# Patient Record
Sex: MALE | Race: WHITE | NOT HISPANIC OR LATINO | Employment: FULL TIME | URBAN - METROPOLITAN AREA
[De-identification: names, ages, dates, MRNs, and addresses within clinical notes are randomized per-mention and may not be internally consistent; named-entity substitution may affect disease eponyms.]

---

## 2017-03-23 ENCOUNTER — TELEPHONE (OUTPATIENT)
Dept: NURSING | Facility: CLINIC | Age: 47
End: 2017-03-23

## 2017-03-23 NOTE — TELEPHONE ENCOUNTER
"Call Type: Triage Call    Presenting Problem: Caller states that he recently starated checking  his blood pressure at home and yesterday he got readings of 137/98  and then it came down to 127/74 and 109/79 before going to bed. He  checked it agin today and got a re ading of 145/103. He says that he  has be en very stressed about work and personal life. He did make an  appoinmtne for Tuesday. He denies any symptoms  Triage Note:  Guideline Title: Blood Pressure Control Problems  Recommended Disposition: See Provider within 72 Hours  Original Inclination: Wanted to speak with a nurse  Override Disposition:  Intended Action: Follow advice given  Physician Contacted: No  Multiple elevated blood pressure readings without other symptoms AND no previous  work-up OR readings exceed expected range defined by treatment plan ?  YES  Unconscious now ? NO  Severe breathing problems ? NO  New or worsening signs and symptoms that may indicate shock ? NO  Hypertension AND pregnant ? NO  Sudden change in mental status ? NO  Diagnosed hypertension, has had a sudden elevation of blood pressure AND new  swelling around eyes; smoky, cola-colored or dark urine; or swelling of  extremities ? NO  Seizure now or within last 6 hours ? NO  Seizure more than 6 hours ago ? NO  New onset of visual disturbances such as double or blurred vision, spots before  eyes or flashing lights ? NO  New onset of breathing problems ? NO  Nosebleed not controlled after 2 attempts of constant direct pressure for a full  10 minutes by a clock (each attempt) ? NO  Having recurring nosebleeds that stop spontaneously or with direct pressure for 10  minutes by a clock OR unexplained episodes of lightheadedness or dizziness ? NO  Sudden, severe disabling head pain OR caller spontaneously verbalizes \"worst  headache of my life\" ? NO  Systolic blood pressure of more than 180 mmHg OR diastolic blood pressure of more  than 120 mmHg ? NO  Chest discomfort associated with " shortness of breath, sweating, odd heartbeats or  different heart rate, nausea, vomiting, lightheadedness, or fainting lasting 5 or  more minutes now or within the last hour ? NO  Chest pain spreading to the shoulders, neck, jaw, in one or both arms, stomach or  back lasting 5 or more minutes now or within the last hour. Pain is NOT  associated with taking a deep breath or a productive cough, movement, or touch to  a localized area. ? NO  Pressure, fullness, squeezing sensation or pain anywhere in the chest lasting 5 or  more minutes now or within the last hour. Pain is NOT associated with taking a  deep breath or a productive cough, movement, or touch to a localized area on the  chest. ? NO  Sudden drop in systolic (20mm/Hg or more than usual reading) OR diastolic blood  pressure (10mm/Hg or more than usual reading) AND recent change in prescription,  nonprescription, or alternative medications or their dosages. ? NO  A sudden elevation in blood pressure (160 or higher systolic  or higher  diastolic) AND has not been taking blood pressure medication as prescribed, OR  recently started, changed, or stopped taking prescription, nonprescription or  alternative meds ? NO  A sudden elevation in blood pressure (160 or higher systolic  or higher  diastolic) AND has recently used cocaine, amphetamines or other stimulants ? NO  New unexplained weakness/paralysis, change in sensation (numbness or tingling) or  inability to purposely move, especially when one side of body is involved  occurring now or within last 4 hours ? NO  New onset of blood in urine ? NO  Physician Instructions:  Care Advice: It is important to have blood pressure checked at least  annually, or at each provider visit, especially if you have a history of  high blood pressure in your family.  Avoid the use of stimulants including caffeine (coffee, some soft drinks,  some energy drinks, tea and chocolate), cocaine, and amphetamines.  Also  avoid  drinking alcohol.  List, or take, all current prescription(s), nonprescription or alternative  medication(s) to provider for evaluation.  Medication Advice: - Discontinue all nonprescription and alternative  medications, especially stimulants, until evaluated by provider. - Take  prescribed medications as directed, following label instructions for the  medication. - Do not change medications or dosing regimen until provider is  consulted. - Know possible side effects of medication and what to do if  they occur. - Tell provider all prescription, nonprescription or  alternative medications that you take  LIFESTYLE MODIFICATION FOR HYPERTENSION:   - Weight reduction will help  lower blood pressure in individuals who are 10% or more above their ideal  body weight.   - Eat foods low in saturated fat and sugar, and high in  complex carbohydrates (vegetables, fruits, whole grains).   - Drink alcohol  only in moderate amounts (12 oz beer, 5 oz wine, or 1 1/2 oz of distilled  alcohol such as vodka, gin, etc.) and not more than 2 drinks/day for men or  1 drink/day for women or lighter-weight persons.   - Get at least 30  minutes of moderate aerobic exercise (using as much energy as walking 2  miles in 30 minutes) most days of the week, preferably daily.   - Stop  smoking to decrease risk for cardiovascular and pulmonary disease, as well  as cancer.   - Keep regularly scheduled appointments with your provider.  Call  immediately if you have a sudden elevation of systolic   or greater, OR if diastolic BP is 120 or greater AND are having chest pain,  shortness of breath, back pain, numbness/weakness, or change in vision.

## 2017-03-28 ENCOUNTER — OFFICE VISIT (OUTPATIENT)
Dept: PEDIATRICS | Facility: CLINIC | Age: 47
End: 2017-03-28
Payer: COMMERCIAL

## 2017-03-28 VITALS
HEIGHT: 72 IN | SYSTOLIC BLOOD PRESSURE: 133 MMHG | BODY MASS INDEX: 24.45 KG/M2 | TEMPERATURE: 97.6 F | DIASTOLIC BLOOD PRESSURE: 70 MMHG | WEIGHT: 180.5 LBS | OXYGEN SATURATION: 99 % | HEART RATE: 85 BPM

## 2017-03-28 DIAGNOSIS — E78.2 MIXED HYPERLIPIDEMIA: ICD-10-CM

## 2017-03-28 DIAGNOSIS — F41.9 ANXIETY: Primary | ICD-10-CM

## 2017-03-28 PROCEDURE — 99213 OFFICE O/P EST LOW 20 MIN: CPT | Performed by: INTERNAL MEDICINE

## 2017-03-28 NOTE — PROGRESS NOTES
SUBJECTIVE:                                                    Marshall Canales is a 46 year old male who presents to clinic today for the following health issues:    Elevated BP, feels hard at times to take a deep breath. Patient notes has a family history BP. Had an uncle who had BP and heart attack. Patient has been checking his BP at home with his machine. Running from 130/60's at home and elevated at the dentist office.      HPI: This gentleman has quite a bit of stress in his life as his longtime girlfriend was recently diagnosed with cancer. When he checks his blood pressure at home it usually high but in the normal range when he sees his doctor or dentist.    PMH:   Patient Active Problem List   Diagnosis     Chronic arthritis     Encounter for long-term current use of medication     Chronic gouty arthropathy     Erectile dysfunction     Hypokalemia     Long-term use of immunosuppressant medication     History reviewed. No pertinent surgical history.    Social History   Substance Use Topics     Smoking status: Never Smoker     Smokeless tobacco: Never Used     Alcohol use 0.0 oz/week     0 Standard drinks or equivalent per week     Family History   Problem Relation Age of Onset     Hypertension Mother      Hypertension Maternal Grandmother      Hypertension Maternal Grandfather      Hypertension Paternal Grandmother      Hypertension Paternal Grandfather      Hypertension Other      Other - See Comments Other      heart attack         Current Outpatient Prescriptions   Medication Sig Dispense Refill     allopurinol (ZYLOPRIM) 100 MG tablet Take 2 tablets (200 mg) by mouth daily 180 tablet 11     meloxicam (MOBIC) 15 MG tablet Take 1 tablet (15 mg) by mouth daily as needed Uses PRN 30 tablet 5     sildenafil (VIAGRA) 100 MG tablet Take 0.5-1 tablets ( mg) by mouth daily as needed for erectile dysfunction Take 30 min to 4 hours before intercourse.  Never use with nitroglycerin, terazosin or doxazosin.  "12 tablet 11     cholecalciferol (VITAMIN D3) 5000 UNITS CAPS capsule Take  by mouth daily.       Multiple Vitamin (MULTI VITAMIN MENS PO) Take  by mouth.       FISH OIL        [DISCONTINUED] allopurinol (ZYLOPRIM) 300 MG tablet Take 1 tablet (300 mg) by mouth daily 90 tablet 1     No Known Allergies    ROS:  C: NEGATIVE for fever, chills, change in weight  E/M: NEGATIVE for ear, mouth and throat problems  R: NEGATIVE for significant cough or SOB  CV: NEGATIVE for chest pain, palpitations or peripheral edema    OBJECTIVE:                                                    /70 (BP Location: Right arm, Patient Position: Chair, Cuff Size: Adult Large)  Pulse 85  Temp 97.6  F (36.4  C) (Temporal)  Ht 6' 0.25\" (1.835 m)  Wt 180 lb 8 oz (81.9 kg)  SpO2 99%  BMI 24.31 kg/m2  Body mass index is 24.31 kg/(m^2).     GENERAL: healthy, alert and no distress  HENT: normal cephalic/atraumatic, nose and mouth without ulcers or lesions, oropharynx clear and oral mucous membranes moist  NECK: no adenopathy, no asymmetry, masses, or scars and thyroid normal to palpation  RESP: lungs clear to auscultation - no rales, rhonchi or wheezes  CV: regular rates and rhythm, normal S1 S2, no S3 or S4, no murmur, click or rub and no peripheral edema  ABDOMEN: soft, nontender, no hepatosplenomegaly, no masses and bowel sounds normal  NEURO: Normal strength and tone, mentation intact and speech normal  PSYCH: mentation appears normal, affect normal/bright    Diagnostic Test Results:  none      ASSESSMENT/PLAN:                                                    1. Anxiety  I reassured the patient that he does not need to worry about his blood pressure. In fact I asked him not to check his blood pressure until he returns to see me in 2 months    2. Mixed hyperlipidemia  Last triglycerides were elevated and will recheck on return  - Lipid Profile (Chol, Trig, HDL, LDL calc); Future        Will call or return to clinic if worsening or " symptoms not improving as discussed.  See Patient Instructions      Alireza Albert MD  [facility]

## 2017-03-28 NOTE — MR AVS SNAPSHOT
After Visit Summary   3/28/2017    aMrshall Canales    MRN: [id number]           Patient Information     Date Of Birth          1970        Visit Information        Provider Department      3/28/2017 1:40 PM Alireza Albert MD [facility]        Today's Diagnoses     Anxiety    -  1    Mixed hyperlipidemia          Care Instructions    It was a pleasure seeing you today at the [facility]. Thank you for allowing us to care for you today. We truly hope we provided you with the excellent service you deserve. Please let us know if there is anything else we can do for you so we can be sure you are leaving completley satisfied with your care experience.       General information about your clinic   Clinic Hours Lab Hours (Appointments are required)   Mon-Thurs: 7:30 AM - 7 PM Mon-Thurs: 7:30 AM - 7 PM   Fri: 7:30 AM - 5 PM Fri: 7:30 AM - 5 PM        After Hours Nurse Advise & Appts:  [facility] Nurse Advisors: [phone]  [facility] On Call: to make appointments anytime: [phone] On Call Physician: call [phone] and answering service will page the on call physician.        For urgent appointments, please call [phone] and ask for the triage nurse or your care team clinic nurse.  How to contact my care team:  MyChart: [email or web address]   Phone: [phone]   Fax: [phone]       [facility]:   Phone: [phone]  Hours: 8:00 AM - 6:00 PM  Medication Refills:  Call your pharmacy and they will forward the refill to us. Please allow 3 business days for your refills to be completed.       Normal or non-critical lab and imaging results will be communicated to you by MyChart, letter or phone within 7 days.  If you do not hear from us within 10 days, please call the clinic. If you have a critical or abnormal lab result, we will notify you by phone as soon as possible.       We now have PWIC (Pediatric Walk in Care)  Monday-Friday  from 7:30-4. Simply walk in and be seen for your urgent needs like cough, fever, rash, diarrhea or vomiting, pink eye, UTI. No appointments needed. Ask one of the team for more information      -Your Care Team:    Dr. Lor Dallas - Internal Medicine/Pediatrics   Dr. Raz Burroughs - Family Medicine  Dr. Britt Hollins - Pediatrics  Rica Medina CNP - Family Practice Nurse Practitioner  Dr. Marlys Huizar - Pediatrics  Dr. Alireza Albert - Internal Medicine                    Follow-ups after your visit        Follow-up notes from your care team     Return in about 2 months (around 5/28/2017).      Your next 10 appointments already scheduled     May 19, 2017  7:30 AM CDT   LAB with LAB FIRST FLOOR [facility] ([facility])    [address] [place] [id number] [phone]           Patient must bring picture ID.  Patient should be prepared to give a urine specimen  Please do not eat 10-12 hours before your appointment if you are coming in fasting for labs on lipids, cholesterol, or glucose (sugar).  Pregnant women should follow their Care Team instructions. Water with medications is okay. Do not drink coffee or other fluids.   If you have concerns about taking  your medications, please ask at office or if scheduling via [facility], send a message by clicking on Secure Messaging, Message Your Care Team.            May 19, 2017  8:00 AM CDT   Return Visit with Feroz Pineda MD   [facility] ([facility]    [address] [place] [id number] [phone]              Future tests that were ordered for you today     Open Future Orders        Priority Expected Expires Ordered    Lipid Profile (Chol, Trig, HDL, LDL calc) Routine 5/18/2017 3/28/2018 3/28/2017            Who to contact     If you have questions or need follow up information about today's clinic visit or your schedule please contact [facility]  "CLINICS directly at [phone].  Normal or non-critical lab and imaging results will be communicated to you by Digital Caddieshart, letter or phone within 4 business days after the clinic has received the results. If you do not hear from us within 7 days, please contact the clinic through Digital Caddieshart or phone. If you have a critical or abnormal lab result, we will notify you by phone as soon as possible.  Submit refill requests through Balanced or call your pharmacy and they will forward the refill request to us. Please allow 3 business days for your refill to be completed.          Additional Information About Your Visit        Balanced Information     Balanced gives you secure access to your electronic health record. If you see a primary care provider, you can also send messages to your care team and make appointments. If you have questions, please call your primary care clinic.  If you do not have a primary care provider, please call [phone] and they will assist you.      Balanced is an electronic gateway that provides easy, online access to your medical records. With Balanced, you can request a clinic appointment, read your test results, renew a prescription or communicate with your care team.     To access your existing account, please contact your [facility] or call [phone] for assistance.        Care EveryWhere ID     This is your Care EveryWhere ID. This could be used by other organizations to access your [facility] medical records  [id number]        Your Vitals Were     Pulse Temperature Height Pulse Oximetry BMI (Body Mass Index)       85 97.6  F (36.4  C) (Temporal) 6' 0.25\" (1.835 m) 99% 24.31 kg/m2        Blood Pressure from Last 3 Encounters:   03/28/17 133/70   11/18/16 135/83   01/22/16 127/80    Weight from Last 3 Encounters:   03/28/17 180 lb 8 oz (81.9 kg)   11/18/16 190 lb (86.2 kg)   01/22/16 193 lb (87.5 kg)                 Today's Medication Changes          These changes " are accurate as of: 3/28/17  2:11 PM.  If you have any questions, ask your nurse or doctor.               These medicines have changed or have updated prescriptions.        Dose/Directions    allopurinol 100 MG tablet   Commonly known as:  ZYLOPRIM   This may have changed:  Another medication with the same name was removed. Continue taking this medication, and follow the directions you see here.   Used for:  Long-term use of immunosuppressant medication, Chronic gouty arthropathy, Chronic arthritis   Changed by:  Feroz Pineda MD        Dose:  200 mg   Take 2 tablets (200 mg) by mouth daily   Quantity:  180 tablet   Refills:  11                Primary Care Provider Office Phone # Fax #    Rica Medina, APRN [facility] [phone]       [facility] [address]  [place] [address]        Thank you!     Thank you for choosing [facility]  for your care. Our goal is always to provide you with excellent care. Hearing back from our patients is one way we can continue to improve our services. Please take a few minutes to complete the written survey that you may receive in the mail after your visit with us. Thank you!             Your Updated Medication List - Protect others around you: Learn how to safely use, store and throw away your medicines at www.disposemymeds.org.          This list is accurate as of: 3/28/17  2:11 PM.  Always use your most recent med list.                   Brand Name Dispense Instructions for use    allopurinol 100 MG tablet    ZYLOPRIM    180 tablet    Take 2 tablets (200 mg) by mouth daily       cholecalciferol 5000 UNITS Caps capsule    vitamin D3     Take  by mouth daily.       FISH OIL          meloxicam 15 MG tablet    MOBIC    30 tablet    Take 1 tablet (15 mg) by mouth daily as needed Uses PRN       MULTI VITAMIN MENS PO      Take  by mouth.       sildenafil 100 MG cap/tab    REVATIO/VIAGRA    12 tablet    Take 0.5-1 tablets  ( mg) by mouth daily as needed for erectile dysfunction Take 30 min to 4 hours before intercourse.  Never use with nitroglycerin, terazosin or doxazosin.

## 2017-03-28 NOTE — PATIENT INSTRUCTIONS
It was a pleasure seeing you today at the [facility]. Thank you for allowing us to care for you today. We truly hope we provided you with the excellent service you deserve. Please let us know if there is anything else we can do for you so we can be sure you are leaving completley satisfied with your care experience.       General information about your clinic   Clinic Hours Lab Hours (Appointments are required)   Mon-Thurs: 7:30 AM - 7 PM Mon-Thurs: 7:30 AM - 7 PM   Fri: 7:30 AM - 5 PM Fri: 7:30 AM - 5 PM        After Hours Nurse Advise & Appts:  [facility] Nurse Advisors: [phone]  [facility] On Call: to make appointments anytime: [phone] On Call Physician: call [phone] and answering service will page the on call physician.        For urgent appointments, please call [phone] and ask for the triage nurse or your care team clinic nurse.  How to contact my care team:  [facility]: [email or web address]   Phone: [phone]   Fax: [phone]       [facility]:   Phone: [phone]  Hours: 8:00 AM - 6:00 PM  Medication Refills:  Call your pharmacy and they will forward the refill to us. Please allow 3 business days for your refills to be completed.       Normal or non-critical lab and imaging results will be communicated to you by MyChart, letter or phone within 7 days.  If you do not hear from us within 10 days, please call the clinic. If you have a critical or abnormal lab result, we will notify you by phone as soon as possible.       We now have PWIC (Pediatric Walk in Care)  Monday-Friday from 7:30-4. Simply walk in and be seen for your urgent needs like cough, fever, rash, diarrhea or vomiting, pink eye, UTI. No appointments needed. Ask one of the team for more information      -Your Care Team:    Dr. Lor Dallas - Internal Medicine/Pediatrics   Dr. Raz Burroughs - Family Medicine  Dr. Britt Hollins - Pediatrics  Rica Medina CNP - Family Practice Nurse  Practitioner  Dr. Marlys Huizar - Pediatrics  Dr. Alireza Albert - Internal Medicine

## 2017-03-28 NOTE — NURSING NOTE
"Chief Complaint   Patient presents with     Hypertension     elevated BP, feels short of breath at times       Initial /70 (BP Location: Right arm, Patient Position: Chair, Cuff Size: Adult Large)  Pulse 85  Temp 97.6  F (36.4  C) (Temporal)  Ht 6' 0.25\" (1.835 m)  Wt 180 lb 8 oz (81.9 kg)  SpO2 99%  BMI 24.31 kg/m2 Estimated body mass index is 24.31 kg/(m^2) as calculated from the following:    Height as of this encounter: 6' 0.25\" (1.835 m).    Weight as of this encounter: 180 lb 8 oz (81.9 kg).  Medication Reconciliation: complete      SHANTELL Jean      "

## 2019-04-02 ENCOUNTER — TELEPHONE (OUTPATIENT)
Dept: PEDIATRICS | Facility: CLINIC | Age: 49
End: 2019-04-02

## 2019-04-02 NOTE — TELEPHONE ENCOUNTER
[facility] Call Center    Phone Message    May a detailed message be left on voicemail: yes    Reason for Call: Other: Pt is scheduled for an appt tomorrow morning at 7:50am for blood in his stool Pt would like a call back from clinic to discuss prior to appt. Please advise.      Action Taken: Message routed to:  Primary Care p [phone]

## 2019-04-02 NOTE — TELEPHONE ENCOUNTER
Called patient- he has a history of fissure hook and then his stool was red.   Today it looks like Licorice red, but it was hard to see at work.  He denies dizziness, fever, vomiting, or feeling ill.  Every month his stool is a florescent shade of green for 2 days.   His stool has never been consistent.   His family has had polyps but he denies other family history.   He eats a lot of spicy peppers, tomatoes. Informed that foods can change stool color.   He will keep his appointment tomorrow to review with a provider.    Nithya Granados RN

## 2019-04-03 ENCOUNTER — OFFICE VISIT (OUTPATIENT)
Dept: PEDIATRICS | Facility: CLINIC | Age: 49
End: 2019-04-03
Payer: COMMERCIAL

## 2019-04-03 VITALS
BODY MASS INDEX: 24.34 KG/M2 | SYSTOLIC BLOOD PRESSURE: 136 MMHG | HEART RATE: 72 BPM | WEIGHT: 183.7 LBS | TEMPERATURE: 98 F | OXYGEN SATURATION: 99 % | DIASTOLIC BLOOD PRESSURE: 87 MMHG | HEIGHT: 73 IN | RESPIRATION RATE: 20 BRPM

## 2019-04-03 DIAGNOSIS — K62.5 RECTAL BLEEDING: Primary | ICD-10-CM

## 2019-04-03 DIAGNOSIS — Z13.220 LIPID SCREENING: ICD-10-CM

## 2019-04-03 LAB
CHOLEST SERPL-MCNC: 283 MG/DL
ERYTHROCYTE [DISTWIDTH] IN BLOOD BY AUTOMATED COUNT: 11.9 % (ref 10–15)
HCT VFR BLD AUTO: 51.8 % (ref 40–53)
HDLC SERPL-MCNC: 101 MG/DL
HGB BLD-MCNC: 18.1 G/DL (ref 13.3–17.7)
LDLC SERPL CALC-MCNC: 150 MG/DL
MCH RBC QN AUTO: 33.4 PG (ref 26.5–33)
MCHC RBC AUTO-ENTMCNC: 34.9 G/DL (ref 31.5–36.5)
MCV RBC AUTO: 96 FL (ref 78–100)
NONHDLC SERPL-MCNC: 182 MG/DL
PLATELET # BLD AUTO: 207 10E9/L (ref 150–450)
RBC # BLD AUTO: 5.42 10E12/L (ref 4.4–5.9)
TRIGL SERPL-MCNC: 162 MG/DL
WBC # BLD AUTO: 7.3 10E9/L (ref 4–11)

## 2019-04-03 PROCEDURE — 80061 LIPID PANEL: CPT | Performed by: FAMILY MEDICINE

## 2019-04-03 PROCEDURE — 99213 OFFICE O/P EST LOW 20 MIN: CPT | Performed by: FAMILY MEDICINE

## 2019-04-03 PROCEDURE — 85027 COMPLETE CBC AUTOMATED: CPT | Performed by: FAMILY MEDICINE

## 2019-04-03 PROCEDURE — 36415 COLL VENOUS BLD VENIPUNCTURE: CPT | Performed by: FAMILY MEDICINE

## 2019-04-03 ASSESSMENT — MIFFLIN-ST. JEOR: SCORE: 1752.14

## 2019-04-03 ASSESSMENT — PAIN SCALES - GENERAL: PAINLEVEL: NO PAIN (0)

## 2019-04-03 NOTE — PATIENT INSTRUCTIONS
Patient Education     Evaluating and Treating Rectal Bleeding     As part of your evaluation, a flexible sigmoidoscopy or colonoscopy may be done. You may also have an upper endoscopy if your stools are darker.     To find the site and cause of your bleeding, you will have a physical exam. You will be asked about your health history. Tests may be done to help confirm the diagnosis and plan your treatment.  Tests you may have  Any of these procedures may be done:    Stool sample. A small amount of your stool will be checked for blood.    Anoscopy. This test uses a small tube (anoscope) to examine the anus. It checks for problems such as hemorrhoids.    Sigmoidoscopy. This test uses a lighted tube to check your rectum and the part of the large intestine that is closest to the rectum (the sigmoid colon).    Colonoscopy. This test looks at your rectum and entire colon. You may be given medicine through an IV to help you relax.    Lower GI (gastrointestinal) series, or barium enema. This is an X-ray test to view your colon. A milky liquid containing barium is passed through your rectum and into the colon. This liquid makes it easy to see your colon on the X-ray.    Upper endoscopy. This test checks your esophagus, stomach, and upper small intestine. It is done in cases of rectal bleeding along with other symptoms like low blood pressure and rapid heartbeat. This test may also be done if your stools are dark black and tarry.    Capsule endoscopy. For this test, you swallow a pill that has a tiny camera inside. The camera takes pictures of your small intestine. It can get to areas that are hard to reach with colonoscopy and upper endoscopy.    Balloon enteroscopy. This test uses a special tube (scope) to get deep into the small intestine.    Tagged red blood cell scan. This test marks (tags) red blood cells with very small amounts of radioactive material. The cells can then be seen and tracked on a scan.    Angiography.  This test threads a tube (catheter) through a vein, often in the leg. The tube injects dye into your blood vessels to see where the bleeding is taking place.  Your treatment plan  Your treatment will depend on the cause of your rectal bleeding. Your healthcare provider will create a treatment plan that s right for you. Sometimes rectal bleeding stops on its own. If it does, be sure to see your provider to check that the problem wasn t serious.  What you can do  Follow all your doctor s instructions. Keep working with your doctor after your treatment. Make and keep your follow-up visits. If you have more rectal bleeding, call your doctor. It may be a sign of the same or another health problem.   Date Last Reviewed: 7/1/2016 2000-2018 [employer]. [address] [place] [address], [place] [address]. All rights reserved. This information is not intended as a substitute for professional medical care. Always follow your healthcare professional's instructions.

## 2019-04-03 NOTE — PROGRESS NOTES
SUBJECTIVE:   Marshall Canales is a 48 year old male who presents to clinic today for the following health issues:      Concern - Pt states on today visit that he noticed some redness blood clot in the stool and would like to discuss what the main cause might be on today visit.  Onset: One day ago yesterday     Description:   Pt states noticed some redness blood clot in the stool      Intensity: 0/10    Progression of Symptoms:  same    Accompanying Signs & Symptoms:  None    Previous history of similar problem:   Not necessary     Precipitating factors:   Worsened by: None    Alleviating factors:  Improved by: None    Therapies Tried and outcome: None        Problem list and histories reviewed & adjusted, as indicated.  Additional history: as documented    Patient Active Problem List   Diagnosis     Chronic arthritis     Encounter for long-term current use of medication     Chronic gouty arthropathy     Erectile dysfunction     Hypokalemia     Long-term use of immunosuppressant medication     No past surgical history on file.    Social History     Tobacco Use     Smoking status: Never Smoker     Smokeless tobacco: Never Used   Substance Use Topics     Alcohol use: Yes     Alcohol/week: 0.0 oz     Family History   Problem Relation Age of Onset     Hypertension Mother      Hypertension Maternal Grandmother      Hypertension Maternal Grandfather      Hypertension Paternal Grandmother      Hypertension Paternal Grandfather      Hypertension Other      Other - See Comments Other         heart attack         Current Outpatient Medications   Medication Sig Dispense Refill     cholecalciferol (VITAMIN D3) 5000 UNITS CAPS capsule Take  by mouth daily.       FISH OIL        Multiple Vitamin (MULTI VITAMIN MENS PO) Take  by mouth.       sildenafil (VIAGRA) 100 MG tablet Take 0.5-1 tablets ( mg) by mouth daily as needed for erectile dysfunction Take 30 min to 4 hours before intercourse.  Never use with nitroglycerin,  "terazosin or doxazosin. 12 tablet 11     allopurinol (ZYLOPRIM) 100 MG tablet Take 2 tablets (200 mg) by mouth daily (Patient not taking: Reported on 4/3/2019) 180 tablet 11     meloxicam (MOBIC) 15 MG tablet Take 1 tablet (15 mg) by mouth daily as needed Uses PRN (Patient not taking: Reported on 4/3/2019) 30 tablet 5       Reviewed and updated as needed this visit by clinical staff  Tobacco  Allergies  Meds       Reviewed and updated as needed this visit by Provider         ROS:  Constitutional, HEENT, cardiovascular, pulmonary, gi and gu systems are negative, except as otherwise noted.    OBJECTIVE:     /87 (BP Location: Right arm, Patient Position: Sitting, Cuff Size: Adult Large)   Pulse 72   Temp 98  F (36.7  C) (Oral)   Resp 20   Ht 1.854 m (6' 1\")   Wt 83.3 kg (183 lb 11.2 oz)   SpO2 99%   BMI 24.24 kg/m    Body mass index is 24.24 kg/m .   GENERAL: healthy, alert and no distress  NECK: no adenopathy, no asymmetry, masses, or scars and thyroid normal to palpation  RESP: lungs clear to auscultation - no rales, rhonchi or wheezes  CV: regular rate and rhythm, normal S1 S2, no S3 or S4, no murmur, click or rub, no peripheral edema and peripheral pulses strong  ABDOMEN: soft, nontender, no hepatosplenomegaly, no masses and bowel sounds normal  RECTAL (male): normal sphincter tone, no rectal masses, prostate normal size, smooth, nontender without nodules or masses        ASSESSMENT:   Marshall was seen today for recheck.    Diagnoses and all orders for this visit:    Rectal bleeding  -     Occult blood stool 1-3 spec; Future  -     CBC with platelets    Lipid screening  -     Lipid panel reflex to direct LDL Fasting        PLAN:   Differentials for anxiety about symptoms/polyps/diverticulosis and rarely AVM  Await occult stool for further management for either a diagnostic vs a screening colonoscopy when he does turn 49.    See Patient Instructions    Van Nesbitt MD  [facility] " CLINICS

## 2019-04-05 ENCOUNTER — TELEPHONE (OUTPATIENT)
Dept: PEDIATRICS | Facility: CLINIC | Age: 49
End: 2019-04-05

## 2019-04-05 DIAGNOSIS — D58.2 ELEVATED HEMOGLOBIN (H): Primary | ICD-10-CM

## 2019-04-05 LAB
COLLECT DATE SP2 STL: NORMAL
COLLECT DATE SP3 STL: NORMAL
COLLECT DATE STL: NORMAL
HEMOCCULT SP1 STL QL: NEGATIVE
HEMOCCULT SP2 STL QL: NEGATIVE
HEMOCCULT SP3 STL QL: NEGATIVE

## 2019-04-05 PROCEDURE — 82270 OCCULT BLOOD FECES: CPT | Performed by: FAMILY MEDICINE

## 2019-04-05 NOTE — TELEPHONE ENCOUNTER
Routing to Procedure coordinator pool as patient needs follow up appointments for non fasting lab, Follow up with Dr. Nesbitt to discuss treatment plan for cholesterol.    Norma Gonzalez RN, [facility]

## 2019-04-05 NOTE — TELEPHONE ENCOUNTER
I reviewed results with patient, his lipid panel is abnormal and hemoglobin is high. We will have him increase his water intake and then repeat a cbc. He would also need an appointment to discuss abnormal lipid and treatment plans. Please schedule.

## 2019-04-05 NOTE — TELEPHONE ENCOUNTER
These are already scheduled as the provider also sent this to our pool.    Hemalatha Membreno  Primary Care   [facility] [place]

## 2019-04-08 ENCOUNTER — OFFICE VISIT (OUTPATIENT)
Dept: PEDIATRICS | Facility: CLINIC | Age: 49
End: 2019-04-08
Payer: COMMERCIAL

## 2019-04-08 DIAGNOSIS — E78.5 HYPERLIPIDEMIA LDL GOAL <100: Primary | ICD-10-CM

## 2019-04-08 DIAGNOSIS — D58.2 ELEVATED HEMOGLOBIN (H): ICD-10-CM

## 2019-04-08 DIAGNOSIS — R03.0 ELEVATED BLOOD PRESSURE READING WITHOUT DIAGNOSIS OF HYPERTENSION: ICD-10-CM

## 2019-04-08 DIAGNOSIS — R03.0 ELEVATED BP WITHOUT DIAGNOSIS OF HYPERTENSION: ICD-10-CM

## 2019-04-08 LAB
BASOPHILS # BLD AUTO: 0.1 10E9/L (ref 0–0.2)
BASOPHILS NFR BLD AUTO: 1 %
DIFFERENTIAL METHOD BLD: ABNORMAL
EOSINOPHIL # BLD AUTO: 0 10E9/L (ref 0–0.7)
EOSINOPHIL NFR BLD AUTO: 0.5 %
ERYTHROCYTE [DISTWIDTH] IN BLOOD BY AUTOMATED COUNT: 11.6 % (ref 10–15)
HCT VFR BLD AUTO: 44.6 % (ref 40–53)
HGB BLD-MCNC: 16.3 G/DL (ref 13.3–17.7)
IMM GRANULOCYTES # BLD: 0 10E9/L (ref 0–0.4)
IMM GRANULOCYTES NFR BLD: 0.2 %
LYMPHOCYTES # BLD AUTO: 1.4 10E9/L (ref 0.8–5.3)
LYMPHOCYTES NFR BLD AUTO: 23.6 %
MCH RBC QN AUTO: 33.7 PG (ref 26.5–33)
MCHC RBC AUTO-ENTMCNC: 36.5 G/DL (ref 31.5–36.5)
MCV RBC AUTO: 92 FL (ref 78–100)
MONOCYTES # BLD AUTO: 0.7 10E9/L (ref 0–1.3)
MONOCYTES NFR BLD AUTO: 10.9 %
NEUTROPHILS # BLD AUTO: 3.9 10E9/L (ref 1.6–8.3)
NEUTROPHILS NFR BLD AUTO: 63.8 %
PLATELET # BLD AUTO: 225 10E9/L (ref 150–450)
RBC # BLD AUTO: 4.83 10E12/L (ref 4.4–5.9)
WBC # BLD AUTO: 6.1 10E9/L (ref 4–11)

## 2019-04-08 PROCEDURE — 99214 OFFICE O/P EST MOD 30 MIN: CPT | Performed by: FAMILY MEDICINE

## 2019-04-08 PROCEDURE — 85025 COMPLETE CBC W/AUTO DIFF WBC: CPT | Performed by: FAMILY MEDICINE

## 2019-04-08 PROCEDURE — 36415 COLL VENOUS BLD VENIPUNCTURE: CPT | Performed by: FAMILY MEDICINE

## 2019-04-08 RX ORDER — ATORVASTATIN CALCIUM 10 MG/1
10 TABLET, FILM COATED ORAL DAILY
Qty: 90 TABLET | Refills: 0 | Status: SHIPPED | OUTPATIENT
Start: 2019-04-08 | End: 2019-07-12

## 2019-04-08 ASSESSMENT — PAIN SCALES - GENERAL: PAINLEVEL: NO PAIN (0)

## 2019-04-08 NOTE — PROGRESS NOTES
SUBJECTIVE:                                                    Marshall Canales is a 48 year old male who presents to clinic today for the following health issues:      Follow up on Lipids:  Pt states that he is here today to discuss lipids results and today results as well.   His hemoglobin was abnormal 5 days ago, this was repeated and now within normal limits.    Hyperlipidemia Follow-Up      Rate your low fat/cholesterol diet?: good    Taking statin?  No    Other lipid medications/supplements?:  none        Problem list and histories reviewed & adjusted, as indicated.  Additional history: as documented    Patient Active Problem List   Diagnosis     Chronic arthritis     Encounter for long-term current use of medication     Chronic gouty arthropathy     Erectile dysfunction     Long-term use of immunosuppressant medication     Hyperlipidemia LDL goal <100     History reviewed. No pertinent surgical history.    Social History     Tobacco Use     Smoking status: Never Smoker     Smokeless tobacco: Never Used   Substance Use Topics     Alcohol use: Yes     Alcohol/week: 0.0 oz     Family History   Problem Relation Age of Onset     Hypertension Mother      Hypertension Maternal Grandmother      Hypertension Maternal Grandfather      Hypertension Paternal Grandmother      Hypertension Paternal Grandfather      Hypertension Other      Other - See Comments Other         heart attack         Current Outpatient Medications   Medication Sig Dispense Refill     atorvastatin (LIPITOR) 10 MG tablet Take 1 tablet (10 mg) by mouth daily 90 tablet 0     cholecalciferol (VITAMIN D3) 5000 UNITS CAPS capsule Take  by mouth daily.       FISH OIL        Multiple Vitamin (MULTI VITAMIN MENS PO) Take  by mouth.       allopurinol (ZYLOPRIM) 100 MG tablet Take 2 tablets (200 mg) by mouth daily (Patient not taking: Reported on 4/3/2019) 180 tablet 11     meloxicam (MOBIC) 15 MG tablet Take 1 tablet (15 mg) by mouth daily as needed Uses  PRN (Patient not taking: Reported on 4/3/2019) 30 tablet 5     sildenafil (VIAGRA) 100 MG tablet Take 0.5-1 tablets ( mg) by mouth daily as needed for erectile dysfunction Take 30 min to 4 hours before intercourse.  Never use with nitroglycerin, terazosin or doxazosin. (Patient not taking: Reported on 4/8/2019) 12 tablet 11     No Known Allergies    ROS:  Constitutional, HEENT, cardiovascular, pulmonary, gi and gu systems are negative, except as otherwise noted.    OBJECTIVE:     /80   Pulse 113   Temp 98.7  F (37.1  C) (Temporal)   Resp 18   Wt 85.3 kg (188 lb)   SpO2 97%   BMI 24.80 kg/m    Body mass index is 24.8 kg/m .   GENERAL: healthy, alert and no distress  EYES: Eyes grossly normal to inspection, PERRL and conjunctivae and sclerae normal  HENT: ear canals and TM's normal, nose and mouth without ulcers or lesions  NECK: no adenopathy, no asymmetry, masses, or scars and thyroid normal to palpation  RESP: lungs clear to auscultation - no rales, rhonchi or wheezes  CV: regular rate and rhythm, normal S1 S2, no S3 or S4, no murmur, click or rub, no peripheral edema and peripheral pulses strong  ABDOMEN: soft, nontender, no hepatosplenomegaly, no masses and bowel sounds normal  NEURO: Normal strength and tone, mentation intact and speech normal  PSYCH: mentation appears normal, affect normal/bright      ASSESSMENT:   Marshall was seen today for lipids.    Diagnoses and all orders for this visit:    Hyperlipidemia LDL goal <100  -     Lipid panel reflex to direct LDL Fasting; Future  -     atorvastatin (LIPITOR) 10 MG tablet; Take 1 tablet (10 mg) by mouth daily  The ASCVD Risk score ([place] DC Jr., et al., 2013) failed to calculate for the following reasons:    The valid HDL cholesterol range is 20 to 100 mg/dL    Elevated hemoglobin (H): Elevated previous lab which may have been due to hemoconcentration, repeat is within normal limits.    Elevated blood pressure without the diagnosis of  hypertension: He was really anxious about his lab results, I reviewed his lab results extensively with him. A repeat blood pressure was within normal limits, he was asymptomatic.    PLAN:     PLAN:  lab results and schedule of future lab studies reviewed with patient, repeat labs ordered prior to next appointment, orders and follow up as documented in EpicCare, reviewed diet, exercise and weight control, recommended sodium restriction, copy of written low fat low cholesterol diet provided and reviewed, cardiovascular risk and specific lipid/LDL goals reviewed, reviewed medications and side effects in detail, use of aspirin to prevent MI and TIA's discussed.    Van Nesbitt MD  [facility] [[email or web address]]

## 2019-04-08 NOTE — PATIENT INSTRUCTIONS
Patient Education     Cholesterol  Does this test have other names?  Total blood cholesterol, serum cholesterol  What is this test?  This test measures the amount of cholesterol in your blood. This helps your healthcare provider figure out your risk for heart disease.  Cholesterol is a waxy fat-like substance found in all of your body's cells, where it plays an important role. But your body can have too much cholesterol if you eat the wrong types of foods. These include fried foods and foods with saturated or trans fats. Some health conditions can also make your cholesterol level too high.  If you have too much cholesterol in your blood, it can stick to the walls of the arteries in your heart (coronary arteries). The extra cholesterol can make your blood vessels narrower (atherosclerosis). This narrowing makes it harder to get enough blood through your blood vessels. If your heart muscles don't get enough blood, you may be at risk for a heart attack. Cholesterol can also stick to the walls of arteries elsewhere in your body. This can cause other types of artery diseases.   The American Heart Association recommends that all adults ages 20 and older have their cholesterol and other traditional risk factors checked every 4 to 6 years. Work with your healthcare provider to find out your risk for cardiovascular disease and stroke.  Why do I need this test?  You may have this test as part of your regular health checkup. You may have this test done more often if you are at risk for heart disease or have other health problems caused by high cholesterol.  Here are some common reasons for the test:    You have risk factors for heart disease. These include older age, obesity, family history of heart disease, high blood pressure, smoking, and diabetes.    You have a condition that may be closely linked to high cholesterol. This includes diabetes, alcoholism, and thyroid, liver, or kidney disease.    You eat a diet high in  "cholesterol and fats.  You may also have this test if you had high or borderline cholesterol on an earlier blood test. Your healthcare provider may want to check to see whether medicine, diet, exercise, and other lifestyle changes are helping lower your cholesterol.  What other tests might I have along with this test?  You may need other blood tests to find out your HDL (\"good\") cholesterol, LDL (\"bad\") cholesterol, and triglyceride levels. This combination of blood tests is called a lipoprotein profile or a lipid profile.  What do my test results mean?  Test results may vary depending on your age, gender, health history, the method used for the test, and other things. Your test results may not mean you have a problem. Ask your healthcare provider what your test results mean for you.    Total cholesterol is measured in milligrams per deciliter (mg/dL). This is what your number may mean:    Less than 200 mg/dL is a good number. Your risk of heart disease may be low.    200 mg/dL to 239 mg/dL is borderline high. You may be at some risk for heart disease.    240 mg/dL or higher means your cholesterol is high. Your risk for heart disease may be higher than that of a person with normal cholesterol.  Keep in mind that your risk for heart disease based on your total cholesterol greatly depends on your HDL and LDL levels and other risk factors.  High cholesterol may be linked to these conditions:    Inherited diseases that cause high cholesterol    Gallbladder stones    Kidney failure    Cancer of the pancreas or prostate    Low thyroid hormone    Diabetes    Obesity  Cholesterol levels below 140 mg/dL may happen with:    Very healthy lifestyle and diet    Severe liver disease    High thyroid hormone    Severe burns    White blood cell cancers    Poor nutrition    Some types of anemia    Short-term illness or infection    Chronic lung disease  How is this test done?  The test is done with a blood sample. A needle is used to " draw blood from a vein in your arm or hand.   Does this test pose any risks?  Having a blood test with a needle carries some risks. These include bleeding, infection, bruising, and feeling lightheaded. When the needle pricks your arm or hand, you may feel a slight sting or pain. Afterward, the site may be sore.   What might affect my test results?  Your diet, age, alcohol use, and other lifestyle choices may affect your results. Many medicines may also affect your results. Pregnancy may also affect your results. Having a heart attack in the last 3 months will also affect your results.  How do I get ready for this test?  You should keep to your regular diet and not drink alcohol for at least 2 days before this test. You will be asked to not eat or drink anything but water for a certain amount of time before the test. This test is usually done in the morning after you fast overnight. If you take medicines in the morning, ask your healthcare provider if you should take your pills.  In addition, be sure your healthcare provider knows about all medicines, herbs, vitamins, and supplements you are taking. This includes medicines that don't need a prescription and any illegal drugs you may use.    4059-9670 [employer]. [address] [place] [address], [place] [address]. All rights reserved. This information is not intended as a substitute for professional medical care. Always follow your healthcare professional's instructions.

## 2019-04-08 NOTE — NURSING NOTE
Marshall Canales's goals for this visit include:   Chief Complaint   Patient presents with     Lipids     lipid follow       He requests these members of his care team be copied on today's visit information: Yes    PCP: Rica Medina    Referring Provider:  No referring provider defined for this encounter.    /89 (BP Location: Right arm, Patient Position: Sitting, Cuff Size: Adult Large)   Pulse 113   Temp 98.7  F (37.1  C) (Temporal)   Resp 18   Wt 85.3 kg (188 lb)   SpO2 97%   BMI 24.80 kg/m      Do you need any medication refills at today's visit? No    Christiano Nunez CMA ([facility]AMA)

## 2019-04-09 VITALS
HEART RATE: 113 BPM | BODY MASS INDEX: 24.8 KG/M2 | DIASTOLIC BLOOD PRESSURE: 80 MMHG | TEMPERATURE: 98.7 F | OXYGEN SATURATION: 97 % | SYSTOLIC BLOOD PRESSURE: 120 MMHG | WEIGHT: 188 LBS | RESPIRATION RATE: 18 BRPM

## 2019-04-09 PROBLEM — R03.0 ELEVATED BP WITHOUT DIAGNOSIS OF HYPERTENSION: Status: ACTIVE | Noted: 2019-04-09

## 2019-07-08 ENCOUNTER — OFFICE VISIT (OUTPATIENT)
Dept: PEDIATRICS | Facility: CLINIC | Age: 49
End: 2019-07-08
Payer: COMMERCIAL

## 2019-07-08 VITALS
OXYGEN SATURATION: 100 % | TEMPERATURE: 98.4 F | BODY MASS INDEX: 24.33 KG/M2 | WEIGHT: 184.4 LBS | SYSTOLIC BLOOD PRESSURE: 136 MMHG | DIASTOLIC BLOOD PRESSURE: 88 MMHG | HEART RATE: 100 BPM

## 2019-07-08 DIAGNOSIS — Z12.11 SPECIAL SCREENING FOR MALIGNANT NEOPLASMS, COLON: ICD-10-CM

## 2019-07-08 DIAGNOSIS — Z12.5 SCREENING FOR PROSTATE CANCER: ICD-10-CM

## 2019-07-08 DIAGNOSIS — E78.5 HYPERLIPIDEMIA LDL GOAL <100: Primary | ICD-10-CM

## 2019-07-08 PROCEDURE — 99214 OFFICE O/P EST MOD 30 MIN: CPT | Performed by: FAMILY MEDICINE

## 2019-07-08 NOTE — PATIENT INSTRUCTIONS
"Patient Education     Controlling Your Cholesterol  Cholesterol is a waxy substance. It travels in your blood through the blood vessels. When you have high cholesterol, it can build up along the walls of the blood vessels. This makes the vessels narrower and decreases blood flow. You are then at greater risk of having a heart attack or a stroke.  Good and bad cholesterol  Lipids are fats, and blood is mostly water. Fat and water don't mix. So our bodies need lipoproteins (lipids inside a protein shell) to carry the lipids. The protein shell carries its lipids through the bloodstream. There are two main kinds of lipoproteins:    LDL (low-density lipoprotein) is known as \"bad cholesterol.\" It mainly carries cholesterol. It delivers this cholesterol to body cells. Excess LDL cholesterol will build up in artery walls. This increases your risk for heart disease and stroke.    HDL (high-density lipoprotein) is known as \"good cholesterol.\" This protein shell collects excess cholesterol that LDLs have left behind on blood vessel walls. That's why high levels of HDL cholesterol can decrease your risk of heart disease and stroke.  Controlling cholesterol levels  Total cholesterol includes LDL and HDL cholesterol, as well as other fats in the bloodstream. If your total cholesterol is high, follow the steps below to help lower your total cholesterol level:  Eat less unhealthy fat    Cut back on saturated fats and trans fats (also called hydrogenated) by selecting lean cuts of meat, low-fat dairy, and using oils instead of solid fats. Limit baked goods, processed meats, and fried foods. A diet that s high in these fats increases your bad cholesterol. It's not enough to just cut back on foods containing cholesterol.    Eat about 2 servings of fish per week. Most fish contain omega-3 fatty acids. These help lower blood cholesterol.    Eat more whole grains and soluble fiber (such as oat bran). These lower overall cholesterol.  Be " active    Choose an activity you enjoy. Walking, swimming, and riding a bike are some good ways to be active.    Start at a level where you feel comfortable. Increase your time and pace a little each week.    Work up to 30 to 40 minutes of moderate to high intensity physical activity at least 3 to 4 days per week.    Remember, some activity is better than none.    If you haven't been exercising regularly, start slowly. Check with your healthcare provider to make sure the exercise plan is right for you.  Quit smoking  Quitting smoking can improve your lipid levels. It also lowers your risk for heart disease and stroke.  Manage your weight  If you are overweight or obese, your healthcare provider will work with you to lose weight and lower your BMI (body mass index) to a normal or near-normal level. Making diet changes and increasing physical activity can help.  Take medicine as directed  Many people need medicine to get their LDL levels to a safe level. Medicine to lower cholesterol levels is effective and safe. Taking medicine is not a substitute for exercise or watching your diet! Your healthcare provider can tell you whether you might benefit from a cholesterol-lowering medicine.  Date Last Reviewed: 6/1/2017 2000-2018 [employer]. [address] [place] [address], [place] [address]. All rights reserved. This information is not intended as a substitute for professional medical care. Always follow your healthcare professional's instructions.

## 2019-07-08 NOTE — PROGRESS NOTES
Subjective     Marshall Canales is a 49 year old male who presents to clinic today for the following health issues:    HPI   Hyperlipidemia Follow-Up      Are you having any of the following symptoms? (Select all that apply)  No complaints of shortness of breath, chest pain or pressure.  No increased sweating or nausea with activity.  No left-sided neck or arm pain.  No complaints of pain in calves when walking 1-2 blocks.    Are you regularly taking any medication or supplement to lower your cholesterol?   Yes- tries to remember    Are you having muscle aches or other side effects that you think could be caused by your cholesterol lowering medication?  No        Amount of exercise or physical activity: 6-7 days/week for an average of work load    Problems taking medications regularly: Yes,  problems remembering to take    Medication side effects: none    Diet: regular (no restrictions)        Reviewed and updated as needed this visit by Provider  Tobacco  Allergies  Meds  Problems  Med Hx  Surg Hx  Fam Hx         Review of Systems   ROS COMP: Constitutional, HEENT, cardiovascular, pulmonary, GI, , musculoskeletal, neuro, skin, endocrine and psych systems are negative, except as otherwise noted.      Objective    /88   Pulse 124   Temp 98.4  F (36.9  C)   Wt 83.6 kg (184 lb 6.4 oz)   SpO2 100%   BMI 24.33 kg/m    Body mass index is 24.33 kg/m .  Physical Exam   GENERAL: healthy, alert and no distress  EYES: Eyes grossly normal to inspection, PERRL and conjunctivae and sclerae normal  HENT: ear canals and TM's normal, nose and mouth without ulcers or lesions  NECK: no adenopathy, no asymmetry, masses, or scars and thyroid normal to palpation  RESP: lungs clear to auscultation - no rales, rhonchi or wheezes  CV: regular rate and rhythm, normal S1 S2, no S3 or S4, no murmur, click or rub, no peripheral edema and peripheral pulses strong  ABDOMEN: soft, nontender, no hepatosplenomegaly, no masses  and bowel sounds normal  MS: no gross musculoskeletal defects noted, no edema        Assessment & Plan     1. Hyperlipidemia LDL goal <100  lab results and schedule of future lab studies reviewed with patient, reviewed diet, exercise and weight control, recommended sodium restriction, copy of written low fat low cholesterol diet provided and reviewed, cardiovascular risk and specific lipid/LDL goals reviewed, reviewed medications and side effects in detail, reviewed potential future medication changes and side effects.  - Lipid panel reflex to direct LDL Fasting  - AST  - ALT    2. Special screening for malignant neoplasms, colon  - GASTROENTEROLOGY ADULT REF PROCEDURE ONLY    3. Screening for prostate cancer  - PSA, screen       Return in about 6 months (around 1/8/2020) for Physical Exam.    Van Nesbitt MD  [facility]

## 2019-07-12 DIAGNOSIS — E78.5 HYPERLIPIDEMIA LDL GOAL <100: Primary | ICD-10-CM

## 2019-07-12 DIAGNOSIS — E78.5 HYPERLIPIDEMIA LDL GOAL <100: ICD-10-CM

## 2019-07-12 DIAGNOSIS — Z12.5 SCREENING FOR PROSTATE CANCER: ICD-10-CM

## 2019-07-12 LAB
ALT SERPL W P-5'-P-CCNC: 94 U/L (ref 0–70)
AST SERPL W P-5'-P-CCNC: 86 U/L (ref 0–45)
CHOLEST SERPL-MCNC: 186 MG/DL
HDLC SERPL-MCNC: 102 MG/DL
LDLC SERPL CALC-MCNC: 67 MG/DL
NONHDLC SERPL-MCNC: 84 MG/DL
PSA SERPL-ACNC: 0.97 UG/L (ref 0–4)
TRIGL SERPL-MCNC: 84 MG/DL

## 2019-07-12 PROCEDURE — 80061 LIPID PANEL: CPT | Performed by: FAMILY MEDICINE

## 2019-07-12 PROCEDURE — 84450 TRANSFERASE (AST) (SGOT): CPT | Performed by: FAMILY MEDICINE

## 2019-07-12 PROCEDURE — 84460 ALANINE AMINO (ALT) (SGPT): CPT | Performed by: FAMILY MEDICINE

## 2019-07-12 PROCEDURE — 36415 COLL VENOUS BLD VENIPUNCTURE: CPT | Performed by: FAMILY MEDICINE

## 2019-07-12 PROCEDURE — G0103 PSA SCREENING: HCPCS | Performed by: FAMILY MEDICINE

## 2020-02-13 ENCOUNTER — OFFICE VISIT (OUTPATIENT)
Dept: PEDIATRICS | Facility: CLINIC | Age: 50
End: 2020-02-13
Payer: COMMERCIAL

## 2020-02-13 VITALS
TEMPERATURE: 98.8 F | WEIGHT: 187.9 LBS | HEART RATE: 106 BPM | BODY MASS INDEX: 24.79 KG/M2 | OXYGEN SATURATION: 98 % | DIASTOLIC BLOOD PRESSURE: 89 MMHG | SYSTOLIC BLOOD PRESSURE: 146 MMHG

## 2020-02-13 DIAGNOSIS — Z11.3 SCREEN FOR STD (SEXUALLY TRANSMITTED DISEASE): ICD-10-CM

## 2020-02-13 DIAGNOSIS — N52.9 ERECTILE DYSFUNCTION, UNSPECIFIED ERECTILE DYSFUNCTION TYPE: ICD-10-CM

## 2020-02-13 DIAGNOSIS — E78.5 HYPERLIPIDEMIA LDL GOAL <100: ICD-10-CM

## 2020-02-13 DIAGNOSIS — R79.89 ELEVATED LFTS: ICD-10-CM

## 2020-02-13 DIAGNOSIS — L08.9 LOCAL INFECTION OF SKIN AND SUBCUTANEOUS TISSUE: Primary | ICD-10-CM

## 2020-02-13 LAB
ALBUMIN SERPL-MCNC: 4 G/DL (ref 3.4–5)
ALP SERPL-CCNC: 100 U/L (ref 40–150)
ALT SERPL W P-5'-P-CCNC: 56 U/L (ref 0–70)
AST SERPL W P-5'-P-CCNC: 46 U/L (ref 0–45)
BILIRUB DIRECT SERPL-MCNC: 0.3 MG/DL (ref 0–0.2)
BILIRUB SERPL-MCNC: 1.4 MG/DL (ref 0.2–1.3)
PROT SERPL-MCNC: 7.9 G/DL (ref 6.8–8.8)
TSH SERPL DL<=0.005 MIU/L-ACNC: 0.95 MU/L (ref 0.4–4)

## 2020-02-13 PROCEDURE — 99214 OFFICE O/P EST MOD 30 MIN: CPT | Performed by: NURSE PRACTITIONER

## 2020-02-13 PROCEDURE — 84443 ASSAY THYROID STIM HORMONE: CPT | Performed by: NURSE PRACTITIONER

## 2020-02-13 PROCEDURE — 86780 TREPONEMA PALLIDUM: CPT | Performed by: NURSE PRACTITIONER

## 2020-02-13 PROCEDURE — 87389 HIV-1 AG W/HIV-1&-2 AB AG IA: CPT | Performed by: NURSE PRACTITIONER

## 2020-02-13 PROCEDURE — 36415 COLL VENOUS BLD VENIPUNCTURE: CPT | Performed by: NURSE PRACTITIONER

## 2020-02-13 PROCEDURE — 86803 HEPATITIS C AB TEST: CPT | Performed by: NURSE PRACTITIONER

## 2020-02-13 PROCEDURE — 87491 CHLMYD TRACH DNA AMP PROBE: CPT | Performed by: NURSE PRACTITIONER

## 2020-02-13 PROCEDURE — 87591 N.GONORRHOEAE DNA AMP PROB: CPT | Performed by: NURSE PRACTITIONER

## 2020-02-13 PROCEDURE — 80076 HEPATIC FUNCTION PANEL: CPT | Performed by: NURSE PRACTITIONER

## 2020-02-13 RX ORDER — SILDENAFIL 100 MG/1
50-100 TABLET, FILM COATED ORAL DAILY PRN
Qty: 12 TABLET | Refills: 11 | Status: SHIPPED | OUTPATIENT
Start: 2020-02-13

## 2020-02-13 RX ORDER — DOXYCYCLINE HYCLATE 100 MG
100 TABLET ORAL 2 TIMES DAILY
Qty: 14 TABLET | Refills: 0 | Status: SHIPPED | OUTPATIENT
Start: 2020-02-13 | End: 2021-01-28

## 2020-02-13 NOTE — PATIENT INSTRUCTIONS
PLAN:   1.   Symptomatic therapy suggested: A high fiber diet with plenty of fluids (up to 8 glasses of water daily) is suggested to relieve these symptoms.  Metamucil, 2 capsules once  daily can be used to keep bowels regular if needed.    2.  Orders Placed This Encounter   Medications     doxycycline hyclate (VIBRA-TABS) 100 MG tablet     Sig: Take 1 tablet (100 mg) by mouth 2 times daily     Dispense:  14 tablet     Refill:  0     sildenafil (VIAGRA) 100 MG tablet     Sig: Take 0.5-1 tablets ( mg) by mouth daily as needed Take 30 min to 4 hours before intercourse.  Never use with nitroglycerin, terazosin or doxazosin.     Dispense:  12 tablet     Refill:  11     Orders Placed This Encounter   Procedures     Treponema Abs w Reflex to RPR and Titer     Hepatitis C Screen Reflex to HCV RNA Quant and Genotype     HIV Antigen Antibody Combo     Lipid panel reflex to direct LDL Fasting     Hepatic panel     TSH with free T4 reflex       3. Patient needs to follow up in if no improvement,or sooner if worsening of symptoms or other symptoms develop.  FUTURE LABS:       - Schedule a fasting blood draw   Will follow up and/or notify patient of  results via My Chart to determine further need for followup

## 2020-02-14 ENCOUNTER — TELEPHONE (OUTPATIENT)
Dept: PEDIATRICS | Facility: CLINIC | Age: 50
End: 2020-02-14

## 2020-02-14 LAB
C TRACH DNA SPEC QL NAA+PROBE: NEGATIVE
HCV AB SERPL QL IA: NONREACTIVE
HIV 1+2 AB+HIV1 P24 AG SERPL QL IA: NONREACTIVE
N GONORRHOEA DNA SPEC QL NAA+PROBE: NEGATIVE
SPECIMEN SOURCE: NORMAL
SPECIMEN SOURCE: NORMAL
T PALLIDUM AB SER QL: NONREACTIVE

## 2020-02-14 NOTE — TELEPHONE ENCOUNTER
Health Call Center    Phone Message    May a detailed message be left on voicemail: yes     Reason for Call: Requesting Results   Patient called requesting results of patient labs done 02/13. Please call patient to discuss.       Action Taken: Message routed to:  Primary Care p [phone]

## 2020-02-15 NOTE — RESULT ENCOUNTER NOTE
Betsy Canales,    Attached are your test results.  -Liver and gallbladder tests (ALT,AST, Alk phos,bilirubin) are  elevated. ADVISE: further testing - will order abdomen ultrasound   I will place order. Please call [phone] to schedule.  -Hepatitis C antibody screen test shows no signs of a previous hepatitis C infection.  -HIV test is normal.  -Chlamydia and gonnohrea tests are normal.  -TSH (thyroid stimulating hormone) level is normal which indicates normal thyroid function.  syphilis is negative      Please contact us if you have any questions.    Rica Medina, CNP

## 2020-02-21 ENCOUNTER — ANCILLARY PROCEDURE (OUTPATIENT)
Dept: ULTRASOUND IMAGING | Facility: CLINIC | Age: 50
End: 2020-02-21
Attending: NURSE PRACTITIONER
Payer: COMMERCIAL

## 2020-02-21 DIAGNOSIS — R79.89 ELEVATED LFTS: ICD-10-CM

## 2020-02-21 DIAGNOSIS — E78.5 HYPERLIPIDEMIA LDL GOAL <100: ICD-10-CM

## 2020-02-21 LAB
CHOLEST SERPL-MCNC: 220 MG/DL
HDLC SERPL-MCNC: 100 MG/DL
LDLC SERPL CALC-MCNC: 105 MG/DL
NONHDLC SERPL-MCNC: 120 MG/DL
TRIGL SERPL-MCNC: 75 MG/DL

## 2020-02-21 PROCEDURE — 80061 LIPID PANEL: CPT | Performed by: NURSE PRACTITIONER

## 2020-02-21 PROCEDURE — 36415 COLL VENOUS BLD VENIPUNCTURE: CPT | Performed by: NURSE PRACTITIONER

## 2020-02-21 PROCEDURE — 76700 US EXAM ABDOM COMPLETE: CPT | Performed by: RADIOLOGY

## 2020-02-23 ENCOUNTER — HEALTH MAINTENANCE LETTER (OUTPATIENT)
Age: 50
End: 2020-02-23

## 2020-02-23 NOTE — RESULT ENCOUNTER NOTE
Betsy Canales,    Attached are your test results.  Ultrasound does not show any findings to explain the elevated liver tests   Would decrease alcohol intake and recheck liver tests in about 2 or 3 weeks   Please contact us if you have any questions.    Rica Medina, CNP

## 2020-02-23 NOTE — RESULT ENCOUNTER NOTE
Betsy Canales,    Attached are your test results.  -LDL(bad) cholesterol level is elevated which can increase your heart disease risk.  A diet high in fat and simple carbohydrates, genetics and being overweight can contribute to this. ADVISE: exercising 150 minutes of aerobic exercise per week (30 minutes for 5 days per week or 50 minutes for 3 days per week are options) and eating a low saturated fat/low carbohydrate diet are helpful to improve this. In 12 months, you should recheck your fasting cholesterol panel by scheduling a lab-only appointment.   Please contact us if you have any questions.    Rica Medina, CNP

## 2020-04-13 ENCOUNTER — TELEPHONE (OUTPATIENT)
Dept: PEDIATRICS | Facility: CLINIC | Age: 50
End: 2020-04-13

## 2020-04-13 DIAGNOSIS — E78.5 HYPERLIPIDEMIA LDL GOAL <100: ICD-10-CM

## 2020-04-13 NOTE — TELEPHONE ENCOUNTER
Subject: RE: Change of Insurance to [employer]*       Good Afternoon. Yes, it was the Sildenafil 100MG.       Thank You,    Bigg    Message routed to provider. Not clear if wants refill? 11 refills available in chart last sent 2/13/2020.Myrna LOPEZ CMA  .Myrna LOPEZCMA

## 2020-05-27 ENCOUNTER — TELEPHONE (OUTPATIENT)
Dept: PEDIATRICS | Facility: CLINIC | Age: 50
End: 2020-05-27

## 2020-05-27 NOTE — TELEPHONE ENCOUNTER
Overdue results review encounter    Orders: hepatic panel  Date ordered: 2/2020    Defer?    Unclear if ordered test still needed. Routing to ordering provider for review.     Provider: Please cancel orders if no longer needed or route to pool for patient contact to schedule.

## 2020-12-13 ENCOUNTER — HEALTH MAINTENANCE LETTER (OUTPATIENT)
Age: 50
End: 2020-12-13

## 2021-01-21 ENCOUNTER — OFFICE VISIT (OUTPATIENT)
Dept: URGENT CARE | Facility: URGENT CARE | Age: 51
End: 2021-01-21
Payer: COMMERCIAL

## 2021-01-21 VITALS
RESPIRATION RATE: 12 BRPM | WEIGHT: 182.8 LBS | BODY MASS INDEX: 24.12 KG/M2 | TEMPERATURE: 97.8 F | HEART RATE: 99 BPM | DIASTOLIC BLOOD PRESSURE: 91 MMHG | OXYGEN SATURATION: 97 % | SYSTOLIC BLOOD PRESSURE: 153 MMHG

## 2021-01-21 DIAGNOSIS — B37.0 THRUSH: Primary | ICD-10-CM

## 2021-01-21 DIAGNOSIS — I10 HYPERTENSION, UNSPECIFIED TYPE: ICD-10-CM

## 2021-01-21 DIAGNOSIS — Z11.3 SCREEN FOR STD (SEXUALLY TRANSMITTED DISEASE): ICD-10-CM

## 2021-01-21 LAB — T PALLIDUM AB SER QL: NONREACTIVE

## 2021-01-21 PROCEDURE — 87340 HEPATITIS B SURFACE AG IA: CPT | Performed by: NURSE PRACTITIONER

## 2021-01-21 PROCEDURE — 36415 COLL VENOUS BLD VENIPUNCTURE: CPT | Performed by: NURSE PRACTITIONER

## 2021-01-21 PROCEDURE — 86780 TREPONEMA PALLIDUM: CPT | Mod: 90 | Performed by: NURSE PRACTITIONER

## 2021-01-21 PROCEDURE — 87491 CHLMYD TRACH DNA AMP PROBE: CPT | Performed by: NURSE PRACTITIONER

## 2021-01-21 PROCEDURE — 87591 N.GONORRHOEAE DNA AMP PROB: CPT | Performed by: NURSE PRACTITIONER

## 2021-01-21 PROCEDURE — 99214 OFFICE O/P EST MOD 30 MIN: CPT | Performed by: NURSE PRACTITIONER

## 2021-01-21 PROCEDURE — 87389 HIV-1 AG W/HIV-1&-2 AB AG IA: CPT | Performed by: NURSE PRACTITIONER

## 2021-01-21 PROCEDURE — 99000 SPECIMEN HANDLING OFFICE-LAB: CPT | Performed by: NURSE PRACTITIONER

## 2021-01-21 RX ORDER — NYSTATIN 100000/ML
500000 SUSPENSION, ORAL (FINAL DOSE FORM) ORAL 4 TIMES DAILY
Qty: 140 ML | Refills: 0 | Status: SHIPPED | OUTPATIENT
Start: 2021-01-21 | End: 2021-01-28

## 2021-01-21 NOTE — PROGRESS NOTES
Chief Complaint   Patient presents with     Mouth Problem     White patch on tongue x5-6 days         ICD-10-CM    1. Thrush  B37.0 nystatin (MYCOSTATIN) 751072 UNIT/ML suspension   2. Screen for STD (sexually transmitted disease)  Z11.3 Hepatitis B surface antigen     **Hepatitis C Screen Reflex to RNA FUTURE anytime     HIV Antigen Antibody Combo     Treponema Abs w Reflex to RPR and Titer     NEISSERIA GONORRHOEA PCR     CHLAMYDIA TRACHOMATIS PCR   3. Hypertension, unspecified type  I10      Patient to check blood pressure twice a day for 2 weeks and keep a log so he may show them to his primary care provider.    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    Medical Decision Making    Differential diagnosis includes but is not limited to: Hypertensive urgency, hypertensive emergency, intracranial hemorrhage, hypertensive nephropathy, myocardial infarction, acute coronary syndrome, medication noncompliance, and renal disease (cardiac, CNS, renal).    Differential diagnosis for tongue includes but is not limited to: Atrophic Glossitis. Thrush, black tongue, hairy tongue, geographic tongue and contact dermatitis of the tongue    Differential diagnosis for STD screening is herpes, syphilis, hepatitis B, hepatitis C, HIV, gonorrhea, chlamydia, or trichomonas      No results found for this or any previous visit (from the past 24 hour(s)).    Subjective     Marshall Canales is an 50 year oldmale who presents to clinic today for 3 issues.  The first issue is a discoloration of his tongue with white patches starting 5 to 6 days ago.  Second issue is of high blood pressure readings and the third issue is request for STD screening.  He has a new partner and would like to be screened prior to sexual relations.    ROS: 10 point ROS neg  other than the symptoms noted above in the HPI.       Objective    BP (!) 153/91 (BP Location: Left arm, Patient Position: Sitting, Cuff Size: Adult Regular)   Pulse 99   Temp 97.8  F (36.6  C) (Tympanic)   Resp 12   Wt 82.9 kg (182 lb 12.8 oz)   SpO2 97%   BMI 24.12 kg/m      Physical Exam       GENERAL APPEARANCE: healthy appearing, alert     EYES: PERRL, EOMI, sclera non-icteric     HENT: ear canals and TM's normal and nose is without ulceration or drainage and tongue has a large white area present, buccal and gingival surfaces appear normal     NECK: no adenopathy or asymmetry, thyroid normal to palpation     RESP: lungs clear to auscultation - no rales, rhonchi or wheezes     CV: regular rates and rhythm, no murmurs, rubs, or gallop     ABDOMEN:  soft, nontender, no HSM or masses and bowel sounds normal     MS: extremities normal- no gross deformities noted; normal muscle tone.     SKIN: no suspicious lesions or rashes     NEURO: Normal strength and tone, mentation intact and speech normal     PSYCH: normal thought process; no significant mood disturbance    Patient Instructions       Patient Education     Candida Infection: Thrush  Thrush is a fungal infection in the mouth and throat. Thrush does not usually affect healthy adults. It is more common in people with a weak immune system. It is also more likely if you take antibiotics. Thrush is normally not contagious.  Understanding fungus in the mouth and throat  Your mouth and throat normally contain millions of tiny organisms. These include bacteria and yeasts. Many of these do not cause any problems. In fact, they may help fight disease.  Yeasts are a type of fungus. A type of yeast called Candida normally lives on the membranes of your mouth and throat. Usually, this yeast grows only in small amounts and is harmless. But in some cases, Candida can grow out of control and cause thrush. Thrush is related to other kinds of Candida infections that can  grow all over the body. Thrush refers to an infection of only the mouth and throat.  What causes thrush?  Thrush happens when something lets too much Candida grow inside your mouth and throat. Certain things that change the normal balance of organisms in the mouth can lead to thrush. One example is antibiotic medicine. This medicine may kill some of the normal bacteria in your mouth. Candida can then grow freely. People on antibiotics have an increased risk for thrush.  You have a higher risk for thrush if you:    Wear dentures    Are getting chemotherapy    Are getting radiation therapy    Have diabetes    Have a transplanted organ    Use corticosteroids, including inhaled corticosteroids for lung disease    Have a weak immune system, such as from AIDS    Are an older adult  Symptoms of thrush  Symptoms of thrush can include:    A dry, cottony feeling in your mouth    Cracking at the corners of the mouth    Loss of taste    Pain while eating or swallowing    White patches on the tongue and around the sides of the mouth  Diagnosing thrush  Your healthcare provider will ask about your medical history and your symptoms. He or she will look closely at your mouth and throat. White or red patches will be scraped with a tongue depressor. The sample will be sent to a lab to test. This test can usually confirm thrush.  If you have thrush, you may also have esophageal candidiasis. This is common in people who have HIV or a weak immune system. Your healthcare provider may check for this condition with an upper endoscopy. This is a procedure to look at the esophagus. A tissue sample may be taken to test.  Treatment for thrush  Thrush is usually treated with antifungal medicine. The medicine is put directly in your mouth and throat. You may be given a  swish and swallow  medicine or an antifungal lozenge.  In some cases, you may need an antifungal pill. This can remove Candida throughout your body. Or you may need medicine  through an intravenous line ( IV). These treatments depend on how severe your infection is, and what other health conditions you have.  If you are at high risk for thrush, you may need to keep taking oral antifungal medicine. This is to help prevent thrush in the future.  What happens if you don t get treated for thrush?  If untreated, the Candida may spread throughout your body. They may even enter your bloodstream. This can cause serious problems, such as organ failure and even death. Bloodstream infection may need to be treated with high doses of antifungal medicine through an IV.  Systemic infection is much more likely in people who are very ill. It is also more common in those who have serious problems with their immune system. Additional risk factors for systemic infection in very ill people include:    Central venous lines    IV nutrition    Use of broad-spectrum antibiotics    Kidney failure    Recent surgery  Preventing thrush  You may be able to help prevent some cases of thrush. Make sure to:    Practice good oral hygiene. Try using a chlorhexidine mouthwash.    Clean your dentures regularly as instructed. Make sure they fit you correctly.    After using a corticosteroid inhaler, rinse out your mouth with water or mouthwash.    Do not use broad-spectrum antibiotics, if possible.    Get treated for health problems that increase your risk for thrush, such as diabetes.     When to call the healthcare provider  Call your healthcare provider right away if you have any of these:    Cottony feeling in your mouth    Loss of taste    Pain while eating or swallowing    White patches or plaques on your tongue or inside your mouth   [employer] last reviewed this educational content on 5/1/2017 2000-2020 [employer]. [address] [place] [address], [place] [address]. All rights reserved. This information is not intended as a substitute for professional medical care. Always follow your healthcare professional's  instructions.           Patient Education     What Are Sexually Transmitted Infections (STIs)?   A sexually transmitted infection (STI) is an infection that is spread during sex. An STI can also be called STD for sexually transmitted disease. You can become infected with an STI if you have sex with someone who has an STI. Any sex that involves the penis, vagina, anus, or mouth can spread these infections. Some STIs also spread through body fluids such as semen, vaginal fluid, or blood. Others spread through contact with infected skin. The most common STIs are chlamydia, genital warts , genital herpes, syphilis, HIV, gonorrhea, and trichomoniasis.   Who is at risk?  It doesn t matter if you re straight or pitt, male or female, young or old. Any person who has sex can get an STI. Your risk increases if:     You have more than one partner. The more partners you have, the greater your risk.    Your partner has other partners. If your partner is exposed to an STI, you could be, too.    You or your partner have had sex with other people in the past. Either of you might be carrying an STI from an earlier partner.    You have an STI. The STI may cause sores or other health problems that increase your risk for new infections. Your risk will stay high unless you are treated for your current STI and change the behaviors that put you at risk.  Prevent future problems  Left untreated, certain STIs can lead to cancer or, rarely, death. Some can harm unborn babies whose mothers are infected. Others can cause you to not be able to have children (sterility) or can affect changes in behavior or your ability to think. You can prevent these problems with safer sex, regular checkups, and early treatment. Always use a latex condom when you have sex. Get tested if you re at risk. And get treated early if you have an STI.      Using a latex condom every time you have sex can reduce your risk of STIs.     Getting checked  The only sure way  to know if you have an STI is to get checked by a healthcare provider. If you notice a change in how your body looks or feels, have it checked out. But keep in mind, STIs don t always show symptoms. So if you re at risk for STIs, get checked regularly. If you find you have an STI, have your partner get treatment. If not, his or her health is at risk. And left untreated, your partner could pass the STI back to you, or on to others.   Common symptoms  Be alert to any changes in your body and your partner s body. Symptoms may appear in or near the vagina, penis, rectum, mouth, or throat. They may include:     Unusual discharge    Lumps, bumps, or rashes    Sores that may be painful, itchy, or painless    Itchy skin    Burning with urination    Pain in the pelvis, belly (abdomen), or rectum    Bleeding from the rectum  Even if you don t have symptoms  You may have an STI even if you don t have symptoms. If you think you are at risk, get checked. Go to a clinic or to your healthcare provider. If your partner has an STI, you need to be tested even if you feel fine.   Vaccines to prevent disease   Vaccines are available to prevent hepatitis A and hepatitis B. These are 2 kinds of STIs. There is also a vaccine to prevent human papillomavirus (HPV). This is a virus that can be passed from person to person through sexual contact. Ask your healthcare provider whether any of these vaccines is right for you.   [facility] last reviewed this educational content on 12/1/2018 2000-2020 [employer]. [address] [place] [address], [place] [address]. All rights reserved. This information is not intended as a substitute for professional medical care. Always follow your healthcare professional's instructions.               AWAIS Clements, CNP  [facility] Provider

## 2021-01-21 NOTE — PATIENT INSTRUCTIONS
Patient Education     Candida Infection: Thrush  Thrush is a fungal infection in the mouth and throat. Thrush does not usually affect healthy adults. It is more common in people with a weak immune system. It is also more likely if you take antibiotics. Thrush is normally not contagious.  Understanding fungus in the mouth and throat  Your mouth and throat normally contain millions of tiny organisms. These include bacteria and yeasts. Many of these do not cause any problems. In fact, they may help fight disease.  Yeasts are a type of fungus. A type of yeast called Candida normally lives on the membranes of your mouth and throat. Usually, this yeast grows only in small amounts and is harmless. But in some cases, Candida can grow out of control and cause thrush. Thrush is related to other kinds of Candida infections that can grow all over the body. Thrush refers to an infection of only the mouth and throat.  What causes thrush?  Thrush happens when something lets too much Candida grow inside your mouth and throat. Certain things that change the normal balance of organisms in the mouth can lead to thrush. One example is antibiotic medicine. This medicine may kill some of the normal bacteria in your mouth. Candida can then grow freely. People on antibiotics have an increased risk for thrush.  You have a higher risk for thrush if you:    Wear dentures    Are getting chemotherapy    Are getting radiation therapy    Have diabetes    Have a transplanted organ    Use corticosteroids, including inhaled corticosteroids for lung disease    Have a weak immune system, such as from AIDS    Are an older adult  Symptoms of thrush  Symptoms of thrush can include:    A dry, cottony feeling in your mouth    Cracking at the corners of the mouth    Loss of taste    Pain while eating or swallowing    White patches on the tongue and around the sides of the mouth  Diagnosing thrush  Your healthcare provider will ask about your medical  history and your symptoms. He or she will look closely at your mouth and throat. White or red patches will be scraped with a tongue depressor. The sample will be sent to a lab to test. This test can usually confirm thrush.  If you have thrush, you may also have esophageal candidiasis. This is common in people who have HIV or a weak immune system. Your healthcare provider may check for this condition with an upper endoscopy. This is a procedure to look at the esophagus. A tissue sample may be taken to test.  Treatment for thrush  Thrush is usually treated with antifungal medicine. The medicine is put directly in your mouth and throat. You may be given a  swish and swallow  medicine or an antifungal lozenge.  In some cases, you may need an antifungal pill. This can remove Candida throughout your body. Or you may need medicine through an intravenous line ( IV). These treatments depend on how severe your infection is, and what other health conditions you have.  If you are at high risk for thrush, you may need to keep taking oral antifungal medicine. This is to help prevent thrush in the future.  What happens if you don t get treated for thrush?  If untreated, the Candida may spread throughout your body. They may even enter your bloodstream. This can cause serious problems, such as organ failure and even death. Bloodstream infection may need to be treated with high doses of antifungal medicine through an IV.  Systemic infection is much more likely in people who are very ill. It is also more common in those who have serious problems with their immune system. Additional risk factors for systemic infection in very ill people include:    Central venous lines    IV nutrition    Use of broad-spectrum antibiotics    Kidney failure    Recent surgery  Preventing thrush  You may be able to help prevent some cases of thrush. Make sure to:    Practice good oral hygiene. Try using a chlorhexidine mouthwash.    Clean your dentures  regularly as instructed. Make sure they fit you correctly.    After using a corticosteroid inhaler, rinse out your mouth with water or mouthwash.    Do not use broad-spectrum antibiotics, if possible.    Get treated for health problems that increase your risk for thrush, such as diabetes.     When to call the healthcare provider  Call your healthcare provider right away if you have any of these:    Cottony feeling in your mouth    Loss of taste    Pain while eating or swallowing    White patches or plaques on your tongue or inside your mouth   [employer] last reviewed this educational content on 5/1/2017 2000-2020 [employer]. [address] [place] [address]. All rights reserved. This information is not intended as a substitute for professional medical care. Always follow your healthcare professional's instructions.           Patient Education     What Are Sexually Transmitted Infections (STIs)?   A sexually transmitted infection (STI) is an infection that is spread during sex. An STI can also be called STD for sexually transmitted disease. You can become infected with an STI if you have sex with someone who has an STI. Any sex that involves the penis, vagina, anus, or mouth can spread these infections. Some STIs also spread through body fluids such as semen, vaginal fluid, or blood. Others spread through contact with infected skin. The most common STIs are chlamydia, genital warts , genital herpes, syphilis, HIV, gonorrhea, and trichomoniasis.   Who is at risk?  It doesn t matter if you re straight or pitt, male or female, young or old. Any person who has sex can get an STI. Your risk increases if:     You have more than one partner. The more partners you have, the greater your risk.    Your partner has other partners. If your partner is exposed to an STI, you could be, too.    You or your partner have had sex with other people in the past. Either of you might be carrying an STI from an  earlier partner.    You have an STI. The STI may cause sores or other health problems that increase your risk for new infections. Your risk will stay high unless you are treated for your current STI and change the behaviors that put you at risk.  Prevent future problems  Left untreated, certain STIs can lead to cancer or, rarely, death. Some can harm unborn babies whose mothers are infected. Others can cause you to not be able to have children (sterility) or can affect changes in behavior or your ability to think. You can prevent these problems with safer sex, regular checkups, and early treatment. Always use a latex condom when you have sex. Get tested if you re at risk. And get treated early if you have an STI.      Using a latex condom every time you have sex can reduce your risk of STIs.     Getting checked  The only sure way to know if you have an STI is to get checked by a healthcare provider. If you notice a change in how your body looks or feels, have it checked out. But keep in mind, STIs don t always show symptoms. So if you re at risk for STIs, get checked regularly. If you find you have an STI, have your partner get treatment. If not, his or her health is at risk. And left untreated, your partner could pass the STI back to you, or on to others.   Common symptoms  Be alert to any changes in your body and your partner s body. Symptoms may appear in or near the vagina, penis, rectum, mouth, or throat. They may include:     Unusual discharge    Lumps, bumps, or rashes    Sores that may be painful, itchy, or painless    Itchy skin    Burning with urination    Pain in the pelvis, belly (abdomen), or rectum    Bleeding from the rectum  Even if you don t have symptoms  You may have an STI even if you don t have symptoms. If you think you are at risk, get checked. Go to a clinic or to your healthcare provider. If your partner has an STI, you need to be tested even if you feel fine.   Vaccines to prevent disease    Vaccines are available to prevent hepatitis A and hepatitis B. These are 2 kinds of STIs. There is also a vaccine to prevent human papillomavirus (HPV). This is a virus that can be passed from person to person through sexual contact. Ask your healthcare provider whether any of these vaccines is right for you.   [employer] last reviewed this educational content on 12/1/2018 2000-2020 [employer]. [address] [place] [address], [place] [address]. All rights reserved. This information is not intended as a substitute for professional medical care. Always follow your healthcare professional's instructions.

## 2021-01-21 NOTE — LETTER
January 22, 2021      Marshall Canales  [address] [facility] [address]        Dear ,    We are writing to inform you of your test results.    STD testing was negative.    Resulted Orders   Hepatitis B surface antigen   Result Value Ref Range    Hep B Surface Agn Nonreactive NR^Nonreactive   HIV Antigen Antibody Combo   Result Value Ref Range    HIV Antigen Antibody Combo Nonreactive NR^Nonreactive          Comment:      HIV-1 p24 Ag & HIV-1/HIV-2 Ab Not Detected   Treponema Abs w Reflex to RPR and Titer   Result Value Ref Range    Treponema Antibodies Nonreactive NR^Nonreactive      Comment:      Methodology Change: Test performed on the [employer] XL by Treponema   pallidum Total Antibodies Assay as of 3.17.2020.     NEISSERIA GONORRHOEA PCR   Result Value Ref Range    Specimen Descrip Urine     N Gonorrhea PCR Negative NEG^Negative      Comment:      Negative for N. gonorrhoeae rRNA by transcription mediated amplification.  A negative result by transcription mediated amplification does not preclude   the presence of N. gonorrhoeae infection because results are dependent on   proper and adequate collection, absence of inhibitors, and sufficient rRNA to   be detected.     CHLAMYDIA TRACHOMATIS PCR   Result Value Ref Range    Specimen Description Urine     Chlamydia Trachomatis PCR Negative NEG^Negative      Comment:      Negative for C. trachomatis rRNA by transcription mediated amplification.  A negative result by transcription mediated amplification does not preclude   the presence of C. trachomatis infection because results are dependent on   proper and adequate collection, absence of inhibitors, and sufficient rRNA to   be detected.         If you have any questions or concerns, please call the clinic at the number listed above.       Sincerely,      Juan Alberto Kyle PA-C

## 2021-01-22 LAB
C TRACH DNA SPEC QL NAA+PROBE: NEGATIVE
HBV SURFACE AG SERPL QL IA: NONREACTIVE
HIV 1+2 AB+HIV1 P24 AG SERPL QL IA: NONREACTIVE
N GONORRHOEA DNA SPEC QL NAA+PROBE: NEGATIVE
SPECIMEN SOURCE: NORMAL
SPECIMEN SOURCE: NORMAL

## 2021-01-27 ENCOUNTER — TELEPHONE (OUTPATIENT)
Dept: FAMILY MEDICINE | Facility: CLINIC | Age: 51
End: 2021-01-27

## 2021-01-27 NOTE — TELEPHONE ENCOUNTER
Called and informed the patient of the information below.    Blossom Vázquez RN, [facility] Triage

## 2021-01-27 NOTE — TELEPHONE ENCOUNTER
"Reason for call: Patient calling, was seen at  last week and was given a medication for thrush with no directions, called pharmacy and they did not know the directions either. Patient googled this and has been \"swishing this around\" and it has not gone away.    Can we leave a detailed message: Yes     Patient  can be reached at: [phone]    Call taken at 8:38 am today, 1/27/2021     Lakia Mcgrath [facility]  2nd Floor  Primary Care      "

## 2021-01-27 NOTE — TELEPHONE ENCOUNTER
Juan Alberto Kyle PA-C sent to Blossom Vázquez RN   Caller: Unspecified (Today,  8:36 AM)             Swish and spit is fine.  If symptoms are not improving, please assist patient in getting appointment with their PCP for follow up.     Juan Alberto Kyle PA-C        MA to call pharmacy and patient with the information below.    Blossom Vázquez RN, [facility] Triage

## 2021-01-27 NOTE — TELEPHONE ENCOUNTER
Provider to clarify if this is a swish and spit or swish and swallow sig.    Sent to Urgent Care to address as that is where Rx was written.    Blossom Vázquez RN, [facility] Triage

## 2021-01-28 ENCOUNTER — OFFICE VISIT (OUTPATIENT)
Dept: FAMILY MEDICINE | Facility: CLINIC | Age: 51
End: 2021-01-28
Payer: COMMERCIAL

## 2021-01-28 VITALS
WEIGHT: 185.4 LBS | HEART RATE: 77 BPM | RESPIRATION RATE: 16 BRPM | DIASTOLIC BLOOD PRESSURE: 72 MMHG | OXYGEN SATURATION: 97 % | SYSTOLIC BLOOD PRESSURE: 130 MMHG | BODY MASS INDEX: 24.46 KG/M2 | TEMPERATURE: 97.9 F

## 2021-01-28 DIAGNOSIS — Z12.11 SPECIAL SCREENING FOR MALIGNANT NEOPLASMS, COLON: ICD-10-CM

## 2021-01-28 DIAGNOSIS — B37.0 THRUSH: Primary | ICD-10-CM

## 2021-01-28 PROCEDURE — 99213 OFFICE O/P EST LOW 20 MIN: CPT | Performed by: NURSE PRACTITIONER

## 2021-01-28 RX ORDER — CLOTRIMAZOLE 10 MG/1
10 LOZENGE ORAL
Qty: 70 LOZENGE | Refills: 0 | Status: SHIPPED | OUTPATIENT
Start: 2021-01-28 | End: 2021-02-11

## 2021-01-28 NOTE — PATIENT INSTRUCTIONS
Patient Education     Candida Infection: Thrush  Thrush is a fungal infection in the mouth and throat. Thrush doesn't usually affect healthy adults. It's more common in people with a weak immune system. It's also more likely if you take antibiotics. Thrush is normally not contagious.   Understanding fungus in the mouth and throat  Your mouth and throat normally contain millions of tiny organisms. These include bacteria and yeasts. Many of these don't cause any problems. In fact, they may help fight disease.   Yeasts are a type of fungus. A type of yeast called Candida normally lives on the membranes of your mouth and throat. It also lives in the digestive tract and on your skin. Usually, this yeast grows only in small amounts and is harmless. But in some cases, Candida can grow out of control and cause thrush. Thrush is related to other kinds of Candida infections that can occur at other parts of the body. Thrush refers to an infection of only the mouth and throat.   What causes thrush?  Thrush happens when something lets too much Candida grow inside your mouth and throat. Certain things that change the normal balance of organisms in the mouth can lead to thrush. One example is antibiotic medicine. This medicine may kill some of the normal bacteria in your mouth. Candida can then grow freely. People on antibiotics have an increased risk for thrush.   You have a higher risk for thrush if you:    Wear dentures    Are getting chemotherapy or radiation therapy    Have diabetes    Have a transplanted organ    Use corticosteroids, including inhaled corticosteroids for lung disease    Have a weak immune system, such as from HIV infection or AIDS    Are an older adult  Symptoms of thrush  Symptoms of thrush can include:    A dry, cottony feeling in your mouth    Cracking at the corners of the mouth    Loss of taste    Pain while eating or swallowing    White patches on the tongue and around the sides of the  "mouth  Diagnosing thrush  Your healthcare provider will ask about your medical history and your symptoms. He or she will look closely at your mouth and throat. White or red patches will be found and may be scraped with a tongue depressor. A sample may be looked at under a microscope or sent to a lab to test. Most cases are confirmed just by their appearance; testing can sometimes help to confirm thrush.   If you have thrush, you may also have esophageal candidiasis. This is more common in people who have AIDS or a weak immune system for another reason. Your healthcare provider may diagnose this based on your symptoms, and may check for this condition with an upper endoscopy. This is a procedure to look at the esophagus. A tissue sample may be taken to test.   Treatment for thrush  Thrush is usually treated with antifungal medicine. For mild cases, the medicine is often applied directly in your mouth and throat. This may be in the form of a  swish and swallow  medicine or an antifungal lozenge to suck on and dissolve in your mouth.   In more extensive cases, or if you have a weakened immune system, you may instead be treated with an antifungal pill. This can be a stronger treatment than a \"swish and swallow\" or lozenge antifungal. Or you may need medicine through an IV (intravenous) line. These treatments depend on how severe your infection is, and what other health conditions you have.   If you are at high risk for thrush, you may need to keep taking oral antifungal medicine. This is to help prevent thrush in the future.   What happens if you don t get treated for thrush?  If untreated, the Candida may make it difficult to eat or drink. Or it can spread to the esophagus and rarely to other parts your body.   Preventing thrush  You may be able to help prevent some cases of thrush. Make sure to:    Practice good oral hygiene.    Clean your dentures regularly as instructed. Make sure they fit you correctly.    After " using a corticosteroid inhaler, rinse out your mouth with water or mouthwash.    Don't use broad-spectrum antibiotics, if possible.    Get treated for health problems that increase your risk for thrush, such as diabetes or HIV.  When to call the healthcare provider  Call your healthcare provider right away if you have any of these:    Cottony feeling in your mouth    Loss of taste    Pain while eating or swallowing    White patches or plaques on your tongue or inside your mouth  Meño last reviewed this educational content on 4/1/2020 2000-2020 [employer]. [address] [place], [place] [id number]. All rights reserved. This information is not intended as a substitute for professional medical care. Always follow your healthcare professional's instructions.

## 2021-01-28 NOTE — PROGRESS NOTES
Assessment & Plan     Thrush  He was previously given nystatin but sounds like he was only given enough for a few days (although the rx was written for 7). He initially wanted PO to be more aggressive to resolve by the weekend. I told him it may take a bit to resolve. Will use the below. Nothing to eat/drink for 30 min after. Decrease alcohol consumption. If not improving could consider PO fluconazole.  - clotrimazole (MYCELEX) 10 MG lozenge; Place 1 lozenge (10 mg) inside cheek 5 times daily for 14 days    Special screening for malignant neoplasms, colon  - Fecal colorectal cancer screen (FIT); Future                   See Patient Instructions    Return in about 2 weeks (around 2/11/2021).     The benefits, risks and potential side effects were discussed in detail. Black box warnings discussed as relevant. All patient questions were answered. The patient was instructed to follow up immediately if any adverse reactions develop.    Return precautions discussed, including when to seek urgent/emergent care.    Patient verbalizes understanding and agrees with plan of care. Patient stable for discharge.      AWAIS Menendez CNP  [facility]    This visit took place during the COVID 19 global pandemic.   PPE worn during the visit included: surgical mask and face shield by me and mask by patient       Subjective     Marshall is a 50 year old who presents to clinic today for the following health issues     HPI       ED/UC Followup:    Facility:  [facility]  Date of visit: 1/21/21  Reason for visit: Thrush  Current Status: slightly better     Pleasant 50 year old male presents with concerns for ongoing thrush. He went to  1 week ago  Girlfriend is coming from [place] this weekend and wants to be sure it's cleared up  2 16 oz glasses of rum + pop per day. Glass is ~40% rum  No chronic diseases including HIV, hepatitis or diabetes  No throat pain  No cough or cold  symptoms  No fever  No drooling or trismus  No trouble eating or drinking    Review of Systems   Constitutional, HEENT, cardiovascular, pulmonary, gi and gu systems are negative, except as otherwise noted.      Objective    /72 (BP Location: Right arm, Patient Position: Chair, Cuff Size: Adult Regular)   Pulse 77   Temp 97.9  F (36.6  C) (Oral)   Resp 16   Wt 84.1 kg (185 lb 6.4 oz)   SpO2 97%   BMI 24.46 kg/m    Body mass index is 24.46 kg/m .  Physical Exam   GENERAL: healthy, alert and no distress  HENT: ear canals and TM's normal, nose and mouth without ulcers or lesions  NECK: no adenopathy, no asymmetry, masses, or scars and thyroid normal to palpation  PSYCH: mentation appears normal, affect normal/bright

## 2021-02-10 ENCOUNTER — VIRTUAL VISIT (OUTPATIENT)
Dept: FAMILY MEDICINE | Facility: CLINIC | Age: 51
End: 2021-02-10
Payer: COMMERCIAL

## 2021-02-10 DIAGNOSIS — Q38.3 TONGUE ABNORMALITY: Primary | ICD-10-CM

## 2021-02-10 PROCEDURE — 99213 OFFICE O/P EST LOW 20 MIN: CPT | Mod: 95 | Performed by: NURSE PRACTITIONER

## 2021-02-10 NOTE — PROGRESS NOTES
"Marshall is a 50 year old who is being evaluated via a billable telephone visit.      What phone number would you like to be contacted at? [phone]  How would you like to obtain your AVS? MyChart    Assessment & Plan     Tongue abnormality  Discussed this could be geographic tongue vs thrush vs other. It came on suddenly and seems to stay on the left side of his tongue but waxes and wanes. I recommend seeing otolaryngology (ENT) as he has now been seen 3 times for this and symptoms are not improving.   - OTOLARYNGOLOGY REFERRAL             Return in about 2 weeks (around 2/24/2021), or if symptoms worsen or fail to improve.     Return precautions discussed, including when to seek urgent/emergent care.    Patient verbalizes understanding and agrees with plan of care.      AWAIS Menendez CNP [facility]    Priya Olivares is a 50 year old who presents for the following health issues     HPI         Pleasant 50 male initiated a virtual visit to discuss ongoing tongue complaints. This is his 3rd visit for this. He was seen in  and then by me a couple of weeks ago. It was thought this was thrush however symptoms persist. He has used nystatin and clotrimazole without relief. Better during the day and worse at night. Doesn't hurt. Normal taste. Wonders if it's \"covid tongue\" although he's never had covid to the best of his knowledge. No reflux. No cough. No trouble swallowing. He would like to get it cleared up ASAP.    Review of Systems   Constitutional, HEENT, cardiovascular, pulmonary, gi and gu systems are negative, except as otherwise noted.      Objective           Vitals:  No vitals were obtained today due to virtual visit.    Physical Exam   healthy, alert and no distress  PSYCH: Alert and oriented times 3; coherent speech, normal   rate and volume, able to articulate logical thoughts, able   to abstract reason, no tangential thoughts, no hallucinations   or delusions  His " affect is normal  RESP: No cough, no audible wheezing, able to talk in full sentences  Remainder of exam unable to be completed due to telephone visits                Phone call duration: 7 minutes

## 2021-02-11 ENCOUNTER — OFFICE VISIT (OUTPATIENT)
Dept: OTOLARYNGOLOGY | Facility: CLINIC | Age: 51
End: 2021-02-11
Payer: COMMERCIAL

## 2021-02-11 VITALS
HEART RATE: 69 BPM | DIASTOLIC BLOOD PRESSURE: 87 MMHG | WEIGHT: 185 LBS | SYSTOLIC BLOOD PRESSURE: 145 MMHG | BODY MASS INDEX: 24.41 KG/M2

## 2021-02-11 DIAGNOSIS — K14.0 GLOSSITIS: Primary | ICD-10-CM

## 2021-02-11 PROCEDURE — 99203 OFFICE O/P NEW LOW 30 MIN: CPT | Performed by: OTOLARYNGOLOGY

## 2021-02-11 NOTE — LETTER
2/11/2021         RE: Marshall Canales  [id number] [facility] [address]        Dear Colleague,    Thank you for referring your patient, Marshall Canales, to the [facility]. Please see a copy of my visit note below.    ENT Consultation    Marshall Canales is a 50 year old male who is seen in consultation at the request of self.      History of Present Illness - Marshall Canales is a 50 year old male presents for evaluation of his tongue.  In the last few weeks he notes that there have been some changes in his tongue.  He noted some white patches on his tongue that were changing also in their appearance and location.  Not experiencing discomfort pain no changes in taste.  There is no ulceration or bleeding.  Patient does not smoke does not chew tobacco.  He is in a new girlfriend has geographic tongue according to the patient.  He was giving nystatin first followed by oral clotrimazole but neither has changed the appearance of the tongue.  He has been a little bit more stressed lately.      Past Medical History -   Past Medical History:   Diagnosis Date     Hyperlipidemia        Current Medications -   Current Outpatient Medications:      Ascorbic Acid (VITAMIN C PO), , Disp: , Rfl:      cholecalciferol (VITAMIN D3) 5000 UNITS CAPS capsule, Take  by mouth daily., Disp: , Rfl:      clotrimazole (MYCELEX) 10 MG lozenge, Place 1 lozenge (10 mg) inside cheek 5 times daily for 14 days, Disp: 70 lozenge, Rfl: 0     FISH OIL, , Disp: , Rfl:      magic mouthwash (ENTER INGREDIENTS IN COMMENTS) suspension, Take 10 mLs by mouth 2 times daily for 14 days, Disp: 480 mL, Rfl: 1     Multiple Vitamin (MULTI VITAMIN MENS PO), Take  by mouth., Disp: , Rfl:      sildenafil (VIAGRA) 100 MG tablet, Take 0.5-1 tablets ( mg) by mouth daily as needed Take 30 min to 4 hours before intercourse.  Never use with nitroglycerin, terazosin or doxazosin., Disp: 12 tablet, Rfl: 11     VITAMIN E PO,  , Disp: , Rfl:     Allergies - No Known Allergies    Social History -   Social History     Socioeconomic History     Marital status: Single     Spouse name: None     Number of children: None     Years of education: None     Highest education level: None   Occupational History     None   Social Needs     Financial resource strain: None     Food insecurity     Worry: None     Inability: None     Transportation needs     Medical: None     Non-medical: None   Tobacco Use     Smoking status: Never Smoker     Smokeless tobacco: Never Used   Substance and Sexual Activity     Alcohol use: Yes     Alcohol/week: 0.0 standard drinks     Drug use: No     Sexual activity: Yes     Partners: Female   Lifestyle     Physical activity     Days per week: None     Minutes per session: None     Stress: None   Relationships     Social connections     Talks on phone: None     Gets together: None     Attends Anabaptist service: None     Active member of club or organization: None     Attends meetings of clubs or organizations: None     Relationship status: None     Intimate partner violence     Fear of current or ex partner: None     Emotionally abused: None     Physically abused: None     Forced sexual activity: None   Other Topics Concern     Parent/sibling w/ CABG, MI or angioplasty before 65F 55M? Not Asked   Social History Narrative     None       Family History -   Family History   Problem Relation Age of Onset     Hypertension Mother      Hypertension Maternal Grandmother      Hypertension Maternal Grandfather      Hypertension Paternal Grandmother      Hypertension Paternal Grandfather      Hypertension Other      Other - See Comments Other         heart attack       Review of Systems - As per HPI and PMHx, otherwise review of system review of the head and neck negative. Otherwise 10+ review systems negative.    Physical Exam  BP (!) 145/87   Pulse 69   Wt 83.9 kg (185 lb)   BMI 24.41 kg/m    BMI: Body mass index is 24.41  kg/m .    General - The patient is well nourished and well developed, and appears to have good nutritional status.  Alert and oriented to person and place, answers questions and cooperates with examination appropriately.    SKIN - No suspicious lesions or rashes.  Respiration - No respiratory distress.  Head and Face - Normocephalic and atraumatic, with no gross asymmetry noted of the contour of the facial features.  The facial nerve is intact, with strong symmetric movements.    Voice and Breathing - The patient was breathing comfortably without the use of accessory muscles. The patients voice was clear and strong, and had appropriate pitch and quality.    Ears - Bilateral pinna and EACs with normal appearing overlying skin. Tympanic membrane intact with good mobility on pneumatic otoscopy bilaterally. Bony landmarks of the ossicular chain are normal. The tympanic membranes are normal in appearance. No retraction, perforation, or masses.  No fluid or purulence was seen in the external canal or the middle ear.     Eyes - Extraocular movements intact.  Sclera were not icteric or injected, conjunctiva were pink and moist.    Mouth - Examination of the oral cavity showed pink, healthy oral mucosa. No lesions or ulcerations noted.  The tongue was mobile and midline, and the dentition were in good condition.  Indeed parts of the area of the tongue appears to have a very smooth denuded surface others have slightly hypertrophic papillae.  No erythema no ulceration noted.  Palpation of the tongue is quite normal.  Tongue mobility is normal.    Throat - The walls of the oropharynx were smooth, pink, moist, symmetric, and had no lesions or ulcerations.  The tonsillar pillars and soft palate were symmetric.  The uvula was midline on elevation.    Neck - Normal midline excursion of the laryngotracheal complex during swallowing.  Full range of motion on passive movement.  Palpation of the occipital, submental, submandibular,  internal jugular chain, and supraclavicular nodes did not demonstrate any abnormal lymph nodes or masses.  The carotid pulse was palpable bilaterally.  Palpation of the thyroid was soft and smooth, with no nodules or goiter appreciated.  The trachea was mobile and midline.    Nose - External contour is symmetric, no gross deflection or scars.  Nasal mucosa is pink and moist with no abnormal mucus.  The septum was midline and non-obstructive, turbinates of normal size and position.  No polyps, masses, or purulence noted on examination.    Neuro - Nonfocal neuro exam is normal, CN 2 through 12 intact, normal gait and muscle tone.      Performed in clinic today:  No procedures preformed in clinic today      A/P - Marshall Canales is a 50 year old male with with glossitis consistent with geographic tongue.  This point we discussed options but he wants to have this treated.  He wants to try Magic mouthwash swish and spit and will prescribe Magic mouthwash consisting of Benadryl Maalox and Decadron to use twice daily for couple weeks followed by Biotene mouthwash.  Will reassess in a month.      Hood Aguirre MD      Again, thank you for allowing me to participate in the care of your patient.        Sincerely,        Hood Aguirre MD, MD

## 2021-02-11 NOTE — PROGRESS NOTES
ENT Consultation    Marshall Canales is a 50 year old male who is seen in consultation at the request of self.      History of Present Illness - Marshall Canales is a 50 year old male presents for evaluation of his tongue.  In the last few weeks he notes that there have been some changes in his tongue.  He noted some white patches on his tongue that were changing also in their appearance and location.  Not experiencing discomfort pain no changes in taste.  There is no ulceration or bleeding.  Patient does not smoke does not chew tobacco.  He is in a new girlfriend has geographic tongue according to the patient.  He was giving nystatin first followed by oral clotrimazole but neither has changed the appearance of the tongue.  He has been a little bit more stressed lately.      Past Medical History -   Past Medical History:   Diagnosis Date     Hyperlipidemia        Current Medications -   Current Outpatient Medications:      Ascorbic Acid (VITAMIN C PO), , Disp: , Rfl:      cholecalciferol (VITAMIN D3) 5000 UNITS CAPS capsule, Take  by mouth daily., Disp: , Rfl:      clotrimazole (MYCELEX) 10 MG lozenge, Place 1 lozenge (10 mg) inside cheek 5 times daily for 14 days, Disp: 70 lozenge, Rfl: 0     FISH OIL, , Disp: , Rfl:      magic mouthwash (ENTER INGREDIENTS IN COMMENTS) suspension, Take 10 mLs by mouth 2 times daily for 14 days, Disp: 480 mL, Rfl: 1     Multiple Vitamin (MULTI VITAMIN MENS PO), Take  by mouth., Disp: , Rfl:      sildenafil (VIAGRA) 100 MG tablet, Take 0.5-1 tablets ( mg) by mouth daily as needed Take 30 min to 4 hours before intercourse.  Never use with nitroglycerin, terazosin or doxazosin., Disp: 12 tablet, Rfl: 11     VITAMIN E PO, , Disp: , Rfl:     Allergies - No Known Allergies    Social History -   Social History     Socioeconomic History     Marital status: Single     Spouse name: None     Number of children: None     Years of education: None     Highest education level: None    Occupational History     None   Social Needs     Financial resource strain: None     Food insecurity     Worry: None     Inability: None     Transportation needs     Medical: None     Non-medical: None   Tobacco Use     Smoking status: Never Smoker     Smokeless tobacco: Never Used   Substance and Sexual Activity     Alcohol use: Yes     Alcohol/week: 0.0 standard drinks     Drug use: No     Sexual activity: Yes     Partners: Female   Lifestyle     Physical activity     Days per week: None     Minutes per session: None     Stress: None   Relationships     Social connections     Talks on phone: None     Gets together: None     Attends Ep[employer] service: None     Active member of club or organization: None     Attends meetings of clubs or organizations: None     Relationship status: None     Intimate partner violence     Fear of current or ex partner: None     Emotionally abused: None     Physically abused: None     Forced sexual activity: None   Other Topics Concern     Parent/sibling w/ CABG, MI or angioplasty before 65F 55M? Not Asked   Social History Narrative     None       Family History -   Family History   Problem Relation Age of Onset     Hypertension Mother      Hypertension Maternal Grandmother      Hypertension Maternal Grandfather      Hypertension Paternal Grandmother      Hypertension Paternal Grandfather      Hypertension Other      Other - See Comments Other         heart attack       Review of Systems - As per HPI and PMHx, otherwise review of system review of the head and neck negative. Otherwise 10+ review systems negative.    Physical Exam  BP (!) 145/87   Pulse 69   Wt 83.9 kg (185 lb)   BMI 24.41 kg/m    BMI: Body mass index is 24.41 kg/m .    General - The patient is well nourished and well developed, and appears to have good nutritional status.  Alert and oriented to person and place, answers questions and cooperates with examination appropriately.    SKIN - No suspicious lesions or  rashes.  Respiration - No respiratory distress.  Head and Face - Normocephalic and atraumatic, with no gross asymmetry noted of the contour of the facial features.  The facial nerve is intact, with strong symmetric movements.    Voice and Breathing - The patient was breathing comfortably without the use of accessory muscles. The patients voice was clear and strong, and had appropriate pitch and quality.    Ears - Bilateral pinna and EACs with normal appearing overlying skin. Tympanic membrane intact with good mobility on pneumatic otoscopy bilaterally. Bony landmarks of the ossicular chain are normal. The tympanic membranes are normal in appearance. No retraction, perforation, or masses.  No fluid or purulence was seen in the external canal or the middle ear.     Eyes - Extraocular movements intact.  Sclera were not icteric or injected, conjunctiva were pink and moist.    Mouth - Examination of the oral cavity showed pink, healthy oral mucosa. No lesions or ulcerations noted.  The tongue was mobile and midline, and the dentition were in good condition.  Indeed parts of the area of the tongue appears to have a very smooth denuded surface others have slightly hypertrophic papillae.  No erythema no ulceration noted.  Palpation of the tongue is quite normal.  Tongue mobility is normal.    Throat - The walls of the oropharynx were smooth, pink, moist, symmetric, and had no lesions or ulcerations.  The tonsillar pillars and soft palate were symmetric.  The uvula was midline on elevation.    Neck - Normal midline excursion of the laryngotracheal complex during swallowing.  Full range of motion on passive movement.  Palpation of the occipital, submental, submandibular, internal jugular chain, and supraclavicular nodes did not demonstrate any abnormal lymph nodes or masses.  The carotid pulse was palpable bilaterally.  Palpation of the thyroid was soft and smooth, with no nodules or goiter appreciated.  The trachea was  mobile and midline.    Nose - External contour is symmetric, no gross deflection or scars.  Nasal mucosa is pink and moist with no abnormal mucus.  The septum was midline and non-obstructive, turbinates of normal size and position.  No polyps, masses, or purulence noted on examination.    Neuro - Nonfocal neuro exam is normal, CN 2 through 12 intact, normal gait and muscle tone.      Performed in clinic today:  No procedures preformed in clinic today      A/P - Marshall Canales is a 50 year old male with with glossitis consistent with geographic tongue.  This point we discussed options but he wants to have this treated.  He wants to try Magic mouthwash swish and spit and will prescribe Magic mouthwash consisting of Benadryl Maalox and Decadron to use twice daily for couple weeks followed by Biotene mouthwash.  Will reassess in a month.      Hood Aguirre MD

## 2021-04-11 ENCOUNTER — HEALTH MAINTENANCE LETTER (OUTPATIENT)
Age: 51
End: 2021-04-11

## 2021-06-03 DIAGNOSIS — Z11.59 ENCOUNTER FOR SCREENING FOR OTHER VIRAL DISEASES: ICD-10-CM

## 2021-06-24 ASSESSMENT — MIFFLIN-ST. JEOR: SCORE: 1743.03

## 2021-06-28 DIAGNOSIS — Z11.59 ENCOUNTER FOR SCREENING FOR OTHER VIRAL DISEASES: ICD-10-CM

## 2021-06-28 LAB
SARS-COV-2 RNA RESP QL NAA+PROBE: NORMAL
SPECIMEN SOURCE: NORMAL

## 2021-06-28 PROCEDURE — U0005 INFEC AGEN DETEC AMPLI PROBE: HCPCS | Performed by: SURGERY

## 2021-06-28 PROCEDURE — U0003 INFECTIOUS AGENT DETECTION BY NUCLEIC ACID (DNA OR RNA); SEVERE ACUTE RESPIRATORY SYNDROME CORONAVIRUS 2 (SARS-COV-2) (CORONAVIRUS DISEASE [COVID-19]), AMPLIFIED PROBE TECHNIQUE, MAKING USE OF HIGH THROUGHPUT TECHNOLOGIES AS DESCRIBED BY CMS-2020-01-R: HCPCS | Performed by: SURGERY

## 2021-06-29 LAB
LABORATORY COMMENT REPORT: NORMAL
SARS-COV-2 RNA RESP QL NAA+PROBE: NEGATIVE
SPECIMEN SOURCE: NORMAL

## 2021-07-01 ENCOUNTER — HOSPITAL ENCOUNTER (OUTPATIENT)
Facility: AMBULATORY SURGERY CENTER | Age: 51
Discharge: HOME | End: 2021-07-01
Attending: SURGERY | Admitting: SURGERY
Payer: COMMERCIAL

## 2021-07-01 VITALS
HEIGHT: 73 IN | OXYGEN SATURATION: 94 % | BODY MASS INDEX: 24.52 KG/M2 | DIASTOLIC BLOOD PRESSURE: 92 MMHG | TEMPERATURE: 97.6 F | RESPIRATION RATE: 16 BRPM | SYSTOLIC BLOOD PRESSURE: 113 MMHG | WEIGHT: 185 LBS | HEART RATE: 72 BPM

## 2021-07-01 LAB — COLONOSCOPY: NORMAL

## 2021-07-01 PROCEDURE — 45385 COLONOSCOPY W/LESION REMOVAL: CPT | Mod: PT | Performed by: SURGERY

## 2021-07-01 PROCEDURE — G8907 PT DOC NO EVENTS ON DISCHARG: HCPCS

## 2021-07-01 PROCEDURE — 99152 MOD SED SAME PHYS/QHP 5/>YRS: CPT | Mod: 59 | Performed by: SURGERY

## 2021-07-01 PROCEDURE — 99153 MOD SED SAME PHYS/QHP EA: CPT | Mod: 59 | Performed by: SURGERY

## 2021-07-01 PROCEDURE — 88305 TISSUE EXAM BY PATHOLOGIST: CPT | Performed by: PATHOLOGY

## 2021-07-01 PROCEDURE — 45381 COLONOSCOPY SUBMUCOUS NJX: CPT

## 2021-07-01 PROCEDURE — 45385 COLONOSCOPY W/LESION REMOVAL: CPT

## 2021-07-01 PROCEDURE — 45381 COLONOSCOPY SUBMUCOUS NJX: CPT | Mod: PT | Performed by: SURGERY

## 2021-07-01 PROCEDURE — G8918 PT W/O PREOP ORDER IV AB PRO: HCPCS

## 2021-07-01 RX ORDER — NALOXONE HYDROCHLORIDE 0.4 MG/ML
0.2 INJECTION, SOLUTION INTRAMUSCULAR; INTRAVENOUS; SUBCUTANEOUS
Status: DISCONTINUED | OUTPATIENT
Start: 2021-07-01 | End: 2021-07-02 | Stop reason: HOSPADM

## 2021-07-01 RX ORDER — FENTANYL CITRATE 50 UG/ML
INJECTION, SOLUTION INTRAMUSCULAR; INTRAVENOUS PRN
Status: DISCONTINUED | OUTPATIENT
Start: 2021-07-01 | End: 2021-07-01 | Stop reason: HOSPADM

## 2021-07-01 RX ORDER — ONDANSETRON 2 MG/ML
4 INJECTION INTRAMUSCULAR; INTRAVENOUS
Status: DISCONTINUED | OUTPATIENT
Start: 2021-07-01 | End: 2021-07-02 | Stop reason: HOSPADM

## 2021-07-01 RX ORDER — NALOXONE HYDROCHLORIDE 0.4 MG/ML
0.4 INJECTION, SOLUTION INTRAMUSCULAR; INTRAVENOUS; SUBCUTANEOUS
Status: DISCONTINUED | OUTPATIENT
Start: 2021-07-01 | End: 2021-07-02 | Stop reason: HOSPADM

## 2021-07-01 RX ORDER — LIDOCAINE 40 MG/G
CREAM TOPICAL
Status: DISCONTINUED | OUTPATIENT
Start: 2021-07-01 | End: 2021-07-02 | Stop reason: HOSPADM

## 2021-07-01 RX ORDER — ONDANSETRON 2 MG/ML
4 INJECTION INTRAMUSCULAR; INTRAVENOUS EVERY 6 HOURS PRN
Status: DISCONTINUED | OUTPATIENT
Start: 2021-07-01 | End: 2021-07-02 | Stop reason: HOSPADM

## 2021-07-01 RX ORDER — ONDANSETRON 4 MG/1
4 TABLET, ORALLY DISINTEGRATING ORAL EVERY 6 HOURS PRN
Status: DISCONTINUED | OUTPATIENT
Start: 2021-07-01 | End: 2021-07-02 | Stop reason: HOSPADM

## 2021-07-01 RX ORDER — PROCHLORPERAZINE MALEATE 10 MG
10 TABLET ORAL EVERY 6 HOURS PRN
Status: DISCONTINUED | OUTPATIENT
Start: 2021-07-01 | End: 2021-07-02 | Stop reason: HOSPADM

## 2021-07-01 RX ORDER — FLUMAZENIL 0.1 MG/ML
0.2 INJECTION, SOLUTION INTRAVENOUS
Status: ACTIVE | OUTPATIENT
Start: 2021-07-01 | End: 2021-07-01

## 2021-07-05 LAB — COPATH REPORT: NORMAL

## 2021-09-26 ENCOUNTER — HEALTH MAINTENANCE LETTER (OUTPATIENT)
Age: 51
End: 2021-09-26

## 2022-05-08 ENCOUNTER — HEALTH MAINTENANCE LETTER (OUTPATIENT)
Age: 52
End: 2022-05-08

## 2023-01-01 ENCOUNTER — OFFICE VISIT (OUTPATIENT)
Dept: URGENT CARE | Facility: URGENT CARE | Age: 53
End: 2023-01-01
Payer: COMMERCIAL

## 2023-01-01 ENCOUNTER — ANCILLARY PROCEDURE (OUTPATIENT)
Dept: GENERAL RADIOLOGY | Facility: CLINIC | Age: 53
End: 2023-01-01
Attending: PHYSICIAN ASSISTANT
Payer: COMMERCIAL

## 2023-01-01 ENCOUNTER — HEALTH MAINTENANCE LETTER (OUTPATIENT)
Age: 53
End: 2023-01-01

## 2023-01-01 VITALS
SYSTOLIC BLOOD PRESSURE: 150 MMHG | BODY MASS INDEX: 23.51 KG/M2 | OXYGEN SATURATION: 98 % | RESPIRATION RATE: 16 BRPM | WEIGHT: 178.2 LBS | HEART RATE: 80 BPM | DIASTOLIC BLOOD PRESSURE: 92 MMHG | TEMPERATURE: 97 F

## 2023-01-01 VITALS
TEMPERATURE: 97.2 F | DIASTOLIC BLOOD PRESSURE: 82 MMHG | OXYGEN SATURATION: 97 % | SYSTOLIC BLOOD PRESSURE: 164 MMHG | WEIGHT: 170 LBS | BODY MASS INDEX: 22.43 KG/M2 | HEART RATE: 99 BPM | RESPIRATION RATE: 17 BRPM

## 2023-01-01 DIAGNOSIS — M02.30 REACTIVE ARTHRITIS, UNSPECIFIED SITE (H): ICD-10-CM

## 2023-01-01 DIAGNOSIS — M79.641 PAIN OF RIGHT HAND: ICD-10-CM

## 2023-01-01 DIAGNOSIS — S60.221A CONTUSION OF RIGHT HAND, INITIAL ENCOUNTER: Primary | ICD-10-CM

## 2023-01-01 DIAGNOSIS — M79.89 SWELLING OF RIGHT FOOT: Primary | ICD-10-CM

## 2023-01-01 DIAGNOSIS — M79.604 RIGHT LEG PAIN: ICD-10-CM

## 2023-01-01 PROCEDURE — 73130 X-RAY EXAM OF HAND: CPT | Mod: TC | Performed by: RADIOLOGY

## 2023-01-01 PROCEDURE — 99214 OFFICE O/P EST MOD 30 MIN: CPT | Performed by: PHYSICIAN ASSISTANT

## 2023-01-01 ASSESSMENT — ENCOUNTER SYMPTOMS
CHILLS: 0
ABDOMINAL PAIN: 0
ALLERGIC/IMMUNOLOGIC NEGATIVE: 1
GASTROINTESTINAL NEGATIVE: 1
DYSURIA: 0
SHORTNESS OF BREATH: 0
CHEST TIGHTNESS: 0
CARDIOVASCULAR NEGATIVE: 1
WHEEZING: 0
ARTHRALGIAS: 1
CONSTITUTIONAL NEGATIVE: 1
RESPIRATORY NEGATIVE: 1
FREQUENCY: 0
HEMATURIA: 0
COUGH: 0
NEUROLOGICAL NEGATIVE: 1
DIARRHEA: 0
MYALGIAS: 0
HEADACHES: 0
FEVER: 0
VOMITING: 0
PALPITATIONS: 0
NAUSEA: 0
SORE THROAT: 0

## 2023-01-08 ENCOUNTER — HEALTH MAINTENANCE LETTER (OUTPATIENT)
Age: 53
End: 2023-01-08

## 2023-07-16 NOTE — PROGRESS NOTES
ASSESSMENT:    ICD-10-CM    1. Leg swelling  M79.89       2. Pain of foot, unspecified laterality  M79.673             PLAN:      53-year-old male with history of reactive arthritis in the past with right foot swelling, pain, redness for several days.  His job is very physical.  Limping.  Also in addition notices some swelling right lateral knee.  No chest pain or shortness of breath.  He denies any injury.  Last week had left knee swelling but this resolved.  Significant right lateral foot swelling, ecchymosis, redness.  Limited on labs and imaging here.  To ER for further evaluation and treatment.  Consider gout, reactive arthritis, cellulitis, stress fracture, DVT, Lyme's.      SUBJECTIVE:  Marshall Canales is an 53 year old male with history of reactive arthritis presents for swelling and pain of the right foot for several days.  Last week had left knee pain and swelling.  Now this week has right lateral superior knee swelling and pain but most recently has had the right foot pain and swelling.  No injury.  History of hyperparathyroidism, status post surgery for it.          Past Medical History:   Diagnosis Date     Hyperlipidemia      History   Smoking Status     Never   Smokeless Tobacco     Never       ROS:  GEN no fevers  SKIN no erythema  Musculoskeletal:  See HPI.      OBJECTIVE:  vitamin    Patient is alert and NAD.  EYES: conjunctiva clear  Leg Exam (right):  Inspection: Right dorsal lateral foot with swelling, ecchymosis, warmth, redness.  Also swelling right lateral superior knee.  Palpation: Tender dorsal lateral foot.  Tender right lateral upper knee and right upper lateral shin near the knee.  No calf tenderness or swelling.  Negative Homans.  Cap refill intact.    Good doralis pedis.  Neurovascularly Intact Distally.         Yareli Venegas PA-C

## 2023-07-16 NOTE — PATIENT INSTRUCTIONS
53-year-old male with history of reactive arthritis in the past with right foot swelling, pain, redness for several days.  His job is very physical.  Limping.  Also in addition notices some swelling right lateral knee.  No chest pain or shortness of breath.  He denies any injury.  Last week had left knee swelling but this resolved.  Significant right lateral foot swelling, ecchymosis, redness.  Limited on labs and imaging here.  To ER for further evaluation and treatment.  Consider gout, reactive arthritis, cellulitis, stress fracture, DVT.

## 2023-10-05 NOTE — PROGRESS NOTES
Chief Complaint:     Chief Complaint   Patient presents with    Hand Injury     Pt tripped over something in the kitchen and fell on the floor with his fist. Right hand swelling and pain. Onset- 10 days         ASSESSMENT     1. Contusion of right hand, initial encounter    2. Pain of right hand       PLAN    XR of the R hand was negative for any acute fracture per my read.  RICE discussed  Recommended rest and avoidance of activities which cause pain or swelling.  Pain relief: Acetaminophen and or Ibuprofen with food.  Patient verbalized understanding, and agrees with this plan.  36 minutes was spent in the care of this patient including chart review, HPI, ROS, PE, review of plan, and placing of orders.      HPI: Marshall Canales is an 53 year old male who presents today for evaluation of R hand injury.  Onset of the injury was 10 days ago when he fell and landed on the hand.    Patient denies any numbness, tingling, or dysfunction of the R arm.    ROS:      Review of Systems   Constitutional: Negative.  Negative for chills and fever.   HENT: Negative.  Negative for sore throat.    Respiratory: Negative.  Negative for cough, chest tightness, shortness of breath and wheezing.    Cardiovascular: Negative.  Negative for chest pain and palpitations.   Gastrointestinal: Negative.  Negative for abdominal pain, diarrhea, nausea and vomiting.   Genitourinary:  Negative for dysuria, frequency, hematuria and urgency.   Musculoskeletal:  Positive for arthralgias. Negative for myalgias.   Skin:  Negative for rash.   Allergic/Immunologic: Negative.  Negative for immunocompromised state.   Neurological: Negative.  Negative for headaches.        Problem history  Patient Active Problem List   Diagnosis    Chronic arthritis    Encounter for long-term current use of medication    Erectile dysfunction    Hyperlipidemia LDL goal <100    Elevated BP without diagnosis of hypertension        Allergies  No Known Allergies     Smoking  History  History   Smoking Status    Never   Smokeless Tobacco    Never        Current Meds    Current Outpatient Medications:     Ascorbic Acid (VITAMIN C PO), , Disp: , Rfl:     cholecalciferol (VITAMIN D3) 5000 UNITS CAPS capsule, Take  by mouth daily. (Patient not taking: Reported on 7/16/2023), Disp: , Rfl:     FISH OIL, , Disp: , Rfl:     Multiple Vitamin (MULTI VITAMIN MENS PO), Take  by mouth. (Patient not taking: Reported on 7/16/2023), Disp: , Rfl:     sildenafil (VIAGRA) 100 MG tablet, Take 0.5-1 tablets ( mg) by mouth daily as needed Take 30 min to 4 hours before intercourse.  Never use with nitroglycerin, terazosin or doxazosin. (Patient not taking: Reported on 7/16/2023), Disp: 12 tablet, Rfl: 11    VITAMIN E PO, , Disp: , Rfl:         Vital signs reviewed by Juan Alberto Kyle PA-C  BP (!) 150/92 (BP Location: Left arm, Patient Position: Sitting, Cuff Size: Adult Regular)   Pulse 80   Temp 97  F (36.1  C) (Tympanic)   Resp 16   Wt 80.8 kg (178 lb 3.2 oz)   SpO2 98%   BMI 23.51 kg/m      Physical Exam     Physical Exam  Vitals and nursing note reviewed.   Constitutional:       General: He is not in acute distress.     Appearance: He is well-developed. He is not ill-appearing, toxic-appearing or diaphoretic.   HENT:      Head: Normocephalic and atraumatic.      Right Ear: Hearing, tympanic membrane, ear canal and external ear normal. Tympanic membrane is not perforated, erythematous, retracted or bulging.      Left Ear: Hearing, tympanic membrane, ear canal and external ear normal. Tympanic membrane is not perforated, erythematous, retracted or bulging.      Nose: Nose normal. No mucosal edema, congestion or rhinorrhea.      Mouth/Throat:      Pharynx: No oropharyngeal exudate or posterior oropharyngeal erythema.      Tonsils: No tonsillar exudate or tonsillar abscesses. 0 on the right. 0 on the left.   Eyes:      Pupils: Pupils are equal, round, and reactive to light.   Cardiovascular:       Rate and Rhythm: Normal rate and regular rhythm.      Heart sounds: Normal heart sounds, S1 normal and S2 normal. Heart sounds not distant. No murmur heard.     No friction rub. No gallop.   Pulmonary:      Effort: Pulmonary effort is normal. No respiratory distress.      Breath sounds: Normal breath sounds. No decreased breath sounds, wheezing, rhonchi or rales.   Abdominal:      General: Bowel sounds are normal. There is no distension.      Palpations: Abdomen is soft.      Tenderness: There is no abdominal tenderness.   Musculoskeletal:      Right hand: Swelling and tenderness present. No deformity. Normal range of motion.        Hands:       Cervical back: Normal range of motion and neck supple.   Lymphadenopathy:      Cervical: No cervical adenopathy.   Skin:     General: Skin is warm and dry.      Findings: No rash.   Neurological:      Mental Status: He is alert.      Cranial Nerves: No cranial nerve deficit.   Psychiatric:         Attention and Perception: He is attentive.         Speech: Speech normal.         Behavior: Behavior normal. Behavior is cooperative.         Thought Content: Thought content normal.         Judgment: Judgment normal.             Juan Alberto Kyle PA-C  10/5/2023, 12:19 PM

## (undated) DEVICE — KIT ENDO FIRST STEP DISINFECTANT 200ML W/POUCH EP-4

## (undated) DEVICE — PREP CHLORAPREP 26ML TINTED ORANGE  260815

## (undated) DEVICE — PAD CHUX UNDERPAD 23X24" 7136

## (undated) RX ORDER — FENTANYL CITRATE 50 UG/ML
INJECTION, SOLUTION INTRAMUSCULAR; INTRAVENOUS
Status: DISPENSED
Start: 2021-07-01